# Patient Record
Sex: MALE | Race: WHITE | Employment: OTHER | URBAN - METROPOLITAN AREA
[De-identification: names, ages, dates, MRNs, and addresses within clinical notes are randomized per-mention and may not be internally consistent; named-entity substitution may affect disease eponyms.]

---

## 2018-05-15 LAB
ALBUMIN SERPL-MCNC: 3.3 G/DL
ALP BLD-CCNC: 64 U/L
ALT SERPL-CCNC: 24 U/L
ANION GAP SERPL CALCULATED.3IONS-SCNC: 0.8 MMOL/L
AST SERPL-CCNC: 21 U/L
BASOPHILS ABSOLUTE: 0 /ΜL
BASOPHILS RELATIVE PERCENT: 0.5 %
BILIRUB SERPL-MCNC: 0.5 MG/DL (ref 0.1–1.4)
BUN BLDV-MCNC: NORMAL MG/DL
CALCIUM SERPL-MCNC: 9.1 MG/DL
CHLORIDE BLD-SCNC: 110 MMOL/L
CO2: 23 MMOL/L
CREAT SERPL-MCNC: 2 MG/DL
EOSINOPHILS ABSOLUTE: 0.4 /ΜL
EOSINOPHILS RELATIVE PERCENT: 5.4 %
GFR CALCULATED: NORMAL
GLUCOSE BLD-MCNC: 130 MG/DL
HCT VFR BLD CALC: 40.4 % (ref 41–53)
HEMOGLOBIN: 13.2 G/DL (ref 13.5–17.5)
LYMPHOCYTES ABSOLUTE: 2.4 /ΜL
LYMPHOCYTES RELATIVE PERCENT: 30.8 %
MCH RBC QN AUTO: 29.8 PG
MCHC RBC AUTO-ENTMCNC: 32.7 G/DL
MCV RBC AUTO: 91.2 FL
MONOCYTES ABSOLUTE: 0.8 /ΜL
MONOCYTES RELATIVE PERCENT: 10.2 %
NEUTROPHILS ABSOLUTE: ABNORMAL /ΜL
NEUTROPHILS RELATIVE PERCENT: ABNORMAL %
PLATELET # BLD: 170 K/ΜL
PMV BLD AUTO: ABNORMAL FL
POTASSIUM SERPL-SCNC: 4.3 MMOL/L
RBC # BLD: 4.43 10^6/ΜL
SODIUM BLD-SCNC: 142 MMOL/L
TOTAL PROTEIN: 7.5
WBC # BLD: 7.7 10^3/ML

## 2018-05-16 LAB
CREATININE, URINE: 165
MICROALBUMIN/CREAT 24H UR: 195 MG/G{CREAT}
MICROALBUMIN/CREAT UR-RTO: 118.2

## 2018-09-13 LAB
ALBUMIN SERPL-MCNC: 4.4 G/DL
ALP BLD-CCNC: 66 U/L
ALT SERPL-CCNC: 13 U/L
ANION GAP SERPL CALCULATED.3IONS-SCNC: 13 MMOL/L
AST SERPL-CCNC: 20 U/L
AVERAGE GLUCOSE: 166
BILIRUB SERPL-MCNC: 0.6 MG/DL (ref 0.1–1.4)
BUN BLDV-MCNC: 28 MG/DL
CALCIUM SERPL-MCNC: 9.4 MG/DL
CHLORIDE BLD-SCNC: 103 MMOL/L
CHOLESTEROL, TOTAL: 125 MG/DL
CHOLESTEROL/HDL RATIO: 3.1
CO2: 26 MMOL/L
CREAT SERPL-MCNC: 1.6 MG/DL
GFR CALCULATED: ABNORMAL
GLUCOSE BLD-MCNC: 161 MG/DL
HBA1C MFR BLD: 7.4 %
HDLC SERPL-MCNC: 40 MG/DL (ref 35–70)
LDL CHOLESTEROL CALCULATED: 64 MG/DL (ref 0–160)
POTASSIUM SERPL-SCNC: 4.3 MMOL/L
SODIUM BLD-SCNC: 142 MMOL/L
TOTAL PROTEIN: 7.4
TRIGL SERPL-MCNC: 106 MG/DL
VLDLC SERPL CALC-MCNC: 21 MG/DL

## 2018-09-19 LAB — DIABETIC RETINOPATHY: NEGATIVE

## 2018-10-22 ENCOUNTER — INITIAL CONSULT (OUTPATIENT)
Dept: CARDIOLOGY CLINIC | Age: 78
End: 2018-10-22
Payer: MEDICARE

## 2018-10-22 VITALS
BODY MASS INDEX: 33.19 KG/M2 | HEART RATE: 69 BPM | WEIGHT: 219 LBS | DIASTOLIC BLOOD PRESSURE: 82 MMHG | HEIGHT: 68 IN | SYSTOLIC BLOOD PRESSURE: 130 MMHG

## 2018-10-22 DIAGNOSIS — R06.02 SOB (SHORTNESS OF BREATH): Primary | ICD-10-CM

## 2018-10-22 PROCEDURE — 93000 ELECTROCARDIOGRAM COMPLETE: CPT | Performed by: INTERNAL MEDICINE

## 2018-10-22 PROCEDURE — 99204 OFFICE O/P NEW MOD 45 MIN: CPT | Performed by: INTERNAL MEDICINE

## 2018-10-22 PROCEDURE — 1101F PT FALLS ASSESS-DOCD LE1/YR: CPT | Performed by: INTERNAL MEDICINE

## 2018-10-22 PROCEDURE — G8417 CALC BMI ABV UP PARAM F/U: HCPCS | Performed by: INTERNAL MEDICINE

## 2018-10-22 PROCEDURE — G8427 DOCREV CUR MEDS BY ELIG CLIN: HCPCS | Performed by: INTERNAL MEDICINE

## 2018-10-22 PROCEDURE — G8484 FLU IMMUNIZE NO ADMIN: HCPCS | Performed by: INTERNAL MEDICINE

## 2018-10-22 RX ORDER — PRAVASTATIN SODIUM 40 MG
40 TABLET ORAL DAILY
COMMUNITY
End: 2019-05-06 | Stop reason: SDUPTHER

## 2018-10-22 RX ORDER — HYDROCHLOROTHIAZIDE 12.5 MG/1
12.5 CAPSULE, GELATIN COATED ORAL DAILY
COMMUNITY
End: 2019-06-04 | Stop reason: SDUPTHER

## 2018-10-22 RX ORDER — METOPROLOL SUCCINATE 50 MG/1
50 TABLET, EXTENDED RELEASE ORAL DAILY
COMMUNITY
End: 2019-05-06 | Stop reason: SDUPTHER

## 2018-10-22 RX ORDER — LOSARTAN POTASSIUM 100 MG/1
100 TABLET ORAL DAILY
COMMUNITY
End: 2019-05-06 | Stop reason: SDUPTHER

## 2018-10-22 RX ORDER — GABAPENTIN 300 MG/1
300 CAPSULE ORAL DAILY
COMMUNITY
End: 2019-06-17 | Stop reason: SDUPTHER

## 2018-10-22 RX ORDER — PRAMIPEXOLE DIHYDROCHLORIDE 0.5 MG/1
0.5 TABLET ORAL DAILY
COMMUNITY
End: 2019-03-14 | Stop reason: SDUPTHER

## 2018-10-22 RX ORDER — GLIPIZIDE 5 MG/1
10 TABLET ORAL DAILY
COMMUNITY
End: 2021-12-29

## 2018-10-22 RX ORDER — DONEPEZIL HYDROCHLORIDE 5 MG/1
5 TABLET, FILM COATED ORAL NIGHTLY
COMMUNITY
End: 2019-08-28 | Stop reason: SDUPTHER

## 2018-10-22 NOTE — PROGRESS NOTES
CARDIOLOGY CONSULT NOTE   Reason for consultation: dyspnea on exertion    Referring physician:  Dr. iHlda Rosa  Primary care physician: Manpreet Pandey      Dear Dr. Hilda Rosa  Thanks for the consult. History of present illness:Nayan is a 66 y. o.year old who  presents with for shortness of breath which is moderate, for many years, intermittent, self limiting, not associated with cough or fever, gets worse with activity and better with rest, he has HTN, DM and dyslipidemia, he has CXR and was though to have atherosclerosis, he has denied any chest pain, no dizziness or syncope, he has quit smoking 25 yrs ago. Chief Complaint   Patient presents with    Hypertension    Hyperlipidemia     Blood pressure, cholesterol, blood glucose and weight are well controlled. Past medical history:    has a past medical history of Cancer ([facility] Hyperlipidemia; and Hypertension. Past surgical history:   has no past surgical history on file. Social History:   reports that he has quit smoking. He has never used smokeless tobacco. He reports that he does not drink alcohol or use drugs. Family history:   no family history of CAD, STROKE of DM    No Known Allergies      No current facility-administered medications for this visit. Current Outpatient Prescriptions   Medication Sig Dispense Refill    metoprolol succinate (TOPROL XL) 50 MG extended release tablet Take 50 mg by mouth daily      aspirin 81 MG tablet Take 81 mg by mouth daily      donepezil (ARICEPT) 5 MG tablet Take 5 mg by mouth nightly      gabapentin (NEURONTIN) 300 MG capsule Take 300 mg by mouth 3 times daily. .      glipiZIDE (GLUCOTROL) 5 MG tablet Take 5 mg by mouth 2 times daily (before meals)      hydrochlorothiazide (MICROZIDE) 12.5 MG capsule Take 12.5 mg by mouth daily      losartan (COZAAR) 100 MG tablet Take 100 mg by mouth daily      metFORMIN (GLUCOPHAGE) 500 MG tablet Take 500 mg by mouth 2 times daily (with meals)

## 2018-10-25 LAB — GLUCOSE BLD-MCNC: 159 MG/DL

## 2018-10-31 ENCOUNTER — PROCEDURE VISIT (OUTPATIENT)
Dept: CARDIOLOGY CLINIC | Age: 78
End: 2018-10-31
Payer: MEDICARE

## 2018-10-31 DIAGNOSIS — R06.02 SOB (SHORTNESS OF BREATH): ICD-10-CM

## 2018-10-31 LAB
LV EF: 51 %
LVEF MODALITY: NORMAL

## 2018-10-31 PROCEDURE — 78452 HT MUSCLE IMAGE SPECT MULT: CPT | Performed by: INTERNAL MEDICINE

## 2018-10-31 PROCEDURE — 93016 CV STRESS TEST SUPVJ ONLY: CPT | Performed by: INTERNAL MEDICINE

## 2018-10-31 PROCEDURE — 93017 CV STRESS TEST TRACING ONLY: CPT | Performed by: INTERNAL MEDICINE

## 2018-10-31 PROCEDURE — 93018 CV STRESS TEST I&R ONLY: CPT | Performed by: INTERNAL MEDICINE

## 2018-10-31 PROCEDURE — A9500 TC99M SESTAMIBI: HCPCS | Performed by: INTERNAL MEDICINE

## 2018-11-01 ENCOUNTER — PROCEDURE VISIT (OUTPATIENT)
Dept: CARDIOLOGY CLINIC | Age: 78
End: 2018-11-01
Payer: MEDICARE

## 2018-11-01 ENCOUNTER — TELEPHONE (OUTPATIENT)
Dept: CARDIOLOGY CLINIC | Age: 78
End: 2018-11-01

## 2018-11-01 DIAGNOSIS — R06.02 SOB (SHORTNESS OF BREATH): ICD-10-CM

## 2018-11-01 DIAGNOSIS — Z13.6 SCREENING FOR AAA (ABDOMINAL AORTIC ANEURYSM): Primary | ICD-10-CM

## 2018-11-01 PROCEDURE — 76706 US ABDL AORTA SCREEN AAA: CPT | Performed by: INTERNAL MEDICINE

## 2018-11-01 NOTE — TELEPHONE ENCOUNTER
Spoke with pt and gave testing results      Summary    Supervising physician Dr. Michael Oliveira .   Wilian Lund LV function.   Naman Henao is normal isotope uptake following exercise and at rest. There is no    evidence of exercise induced ischemia.    This is a normal study.        Recommendation    Recommendation: Routine follow-up.      Summary        No evidence of AAA within the visualized portions of the abdominal aorta.        Recommendations        optimize medications

## 2018-11-02 LAB
ALBUMIN SERPL-MCNC: 4.5 G/DL
ALP BLD-CCNC: 65 U/L
ALT SERPL-CCNC: 16 U/L
ANION GAP SERPL CALCULATED.3IONS-SCNC: ABNORMAL MMOL/L
AST SERPL-CCNC: 18 U/L
AVERAGE GLUCOSE: ABNORMAL
BILIRUB SERPL-MCNC: 0.6 MG/DL (ref 0.1–1.4)
BUN BLDV-MCNC: 25 MG/DL
CALCIUM SERPL-MCNC: 9.4 MG/DL
CHLORIDE BLD-SCNC: 103 MMOL/L
CHOLESTEROL, TOTAL: 132 MG/DL
CHOLESTEROL/HDL RATIO: NORMAL
CO2: 26 MMOL/L
CREAT SERPL-MCNC: 1.8 MG/DL
CREATININE, URINE: 151.9
GFR CALCULATED: 35
GLUCOSE BLD-MCNC: 126 MG/DL
HBA1C MFR BLD: 7.3 %
HDLC SERPL-MCNC: 37 MG/DL (ref 35–70)
LDL CHOLESTEROL CALCULATED: 74 MG/DL (ref 0–160)
MICROALBUMIN/CREAT 24H UR: ABNORMAL MG/G{CREAT}
MICROALBUMIN/CREAT UR-RTO: 160
POTASSIUM SERPL-SCNC: 4.4 MMOL/L
SODIUM BLD-SCNC: 143 MMOL/L
TOTAL PROTEIN: 7.3
TRIGL SERPL-MCNC: 107 MG/DL
VLDLC SERPL CALC-MCNC: 21 MG/DL

## 2018-11-14 ENCOUNTER — PROCEDURE VISIT (OUTPATIENT)
Dept: CARDIOLOGY CLINIC | Age: 78
End: 2018-11-14
Payer: MEDICARE

## 2018-11-14 DIAGNOSIS — R06.02 SOB (SHORTNESS OF BREATH): Primary | ICD-10-CM

## 2018-11-14 LAB
LV EF: 58 %
LVEF MODALITY: NORMAL

## 2018-11-14 PROCEDURE — 93306 TTE W/DOPPLER COMPLETE: CPT | Performed by: INTERNAL MEDICINE

## 2018-11-19 ENCOUNTER — OFFICE VISIT (OUTPATIENT)
Dept: CARDIOLOGY CLINIC | Age: 78
End: 2018-11-19
Payer: MEDICARE

## 2018-11-19 ENCOUNTER — TELEPHONE (OUTPATIENT)
Dept: CARDIOLOGY CLINIC | Age: 78
End: 2018-11-19

## 2018-11-19 VITALS
SYSTOLIC BLOOD PRESSURE: 118 MMHG | HEIGHT: 68 IN | HEART RATE: 88 BPM | DIASTOLIC BLOOD PRESSURE: 64 MMHG | WEIGHT: 221.6 LBS | BODY MASS INDEX: 33.59 KG/M2

## 2018-11-19 DIAGNOSIS — R06.02 SHORTNESS OF BREATH: Primary | ICD-10-CM

## 2018-11-19 PROCEDURE — 99214 OFFICE O/P EST MOD 30 MIN: CPT | Performed by: INTERNAL MEDICINE

## 2018-11-19 PROCEDURE — G8484 FLU IMMUNIZE NO ADMIN: HCPCS | Performed by: INTERNAL MEDICINE

## 2018-11-19 PROCEDURE — G8427 DOCREV CUR MEDS BY ELIG CLIN: HCPCS | Performed by: INTERNAL MEDICINE

## 2018-11-19 PROCEDURE — 1123F ACP DISCUSS/DSCN MKR DOCD: CPT | Performed by: INTERNAL MEDICINE

## 2018-11-19 PROCEDURE — G8417 CALC BMI ABV UP PARAM F/U: HCPCS | Performed by: INTERNAL MEDICINE

## 2018-11-19 PROCEDURE — 1036F TOBACCO NON-USER: CPT | Performed by: INTERNAL MEDICINE

## 2018-11-19 PROCEDURE — 1101F PT FALLS ASSESS-DOCD LE1/YR: CPT | Performed by: INTERNAL MEDICINE

## 2018-11-19 PROCEDURE — 4040F PNEUMOC VAC/ADMIN/RCVD: CPT | Performed by: INTERNAL MEDICINE

## 2018-11-19 NOTE — PROGRESS NOTES
Ashli Jenkins MD        OFFICE  FOLLOWUP NOTE    Chief complaints: patient is here for management of shortness of breath, HTN, DM, DYSLIPIDEMIA    Subjective: patient feels better, no chest pain, no shortness of breath, no dizziness, no palpitations    HPI Michael Campos is a 66 y. o.year old who  has a past medical [facility] of [facility] ([facility] H/O Doppler ultrasound; H/O echocardiogram; History of nuclear stress test; Hyperlipidemia; and Hypertension. and presents for management of  shortness of breath, HTN, DM, DYSLIPIDEMIA which are well controlled, NORMAL STRES TEST, ABDOMINAL ULTRASOUND      Current Outpatient Prescriptions   Medication Sig Dispense Refill    metoprolol succinate (TOPROL XL) 50 MG extended release tablet Take 50 mg by mouth daily      aspirin 81 MG tablet Take 81 mg by mouth daily      donepezil (ARICEPT) 5 MG tablet Take 5 mg by mouth nightly      gabapentin (NEURONTIN) 300 MG capsule Take 300 mg by mouth 3 times daily. .      glipiZIDE (GLUCOTROL) 5 MG tablet Take 5 mg by mouth 2 times daily (before meals)      hydrochlorothiazide (MICROZIDE) 12.5 MG capsule Take 12.5 mg by mouth daily      losartan (COZAAR) 100 MG tablet Take 100 mg by mouth daily      metFORMIN (GLUCOPHAGE) 500 MG tablet Take 500 mg by mouth 2 times daily (with meals)      pramipexole (MIRAPEX) 0.5 MG tablet Take 0.5 mg by mouth 3 times daily      pravastatin (PRAVACHOL) 40 MG tablet Take 40 mg by mouth daily       No current facility-administered medications for this visit. Allergies: Patient has no known allergies.   Past Medical History:   Diagnosis Date    Cancer ([facility]     Diabetes mellitus ([facility]     H/O Doppler ultrasound 10/31/2018    No evidence of AAA within the visualized portions of the abdominal aorta    H/O echocardiogram 11/14/2018    EF 55-60%    History of nuclear stress test 10/31/2018    Normal LV function    Hyperlipidemia     Hypertension      History reviewed. No pertinent surgical history. Family History   Problem Relation Age of Onset    Heart Attack Mother     Heart Attack Father      Social History   Substance Use Topics    Smoking status: Former Smoker    Smokeless tobacco: Never Used    Alcohol use No      [unfilled]  Review of Systems:   · Constitutional: No Fever or Weight Loss   · Eyes: No Decreased Vision  · ENT: No Headaches, Hearing Loss or Vertigo  · Cardiovascular: No chest pain,+ dyspnea on exertion, palpitations or loss of consciousness  · Respiratory: No cough or wheezing    · Gastrointestinal: No abdominal pain, appetite loss, blood in stools, constipation, diarrhea or heartburn  · Genitourinary: No dysuria, trouble voiding, or hematuria  · Musculoskeletal:  No gait disturbance, weakness or joint complaints  · Integumentary: No rash or pruritis  · Neurological: No TIA or stroke symptoms  · Psychiatric: No anxiety or depression  · Endocrine: No malaise, fatigue or temperature intolerance  · Hematologic/Lymphatic: No bleeding problems, blood clots or swollen lymph nodes  · Allergic/Immunologic: No nasal congestion or hives  All systems negative except as marked. Objective:  /64 (Site: Right Upper Arm, Position: Sitting, Cuff Size: Medium Adult)   Pulse 88   Ht 5' 8\" (1.727 m)   Wt 221 lb 9.6 oz (100.5 kg)   BMI 33.69 kg/m²   Wt Readings from Last 3 Encounters:   11/19/18 221 lb 9.6 oz (100.5 kg)   10/22/18 219 lb (99.3 kg)     Body mass index is 33.69 kg/m². GENERAL - Alert, oriented, pleasant, in no apparent distress,normal grooming  HEENT - pupils are reactive to light and accomodation, cornea intact, conjunctive normal color, ears are normal in exam,throat exam in normal, teeth, gum and palate are normal, oral mucosa is normal without any notation of pallor or cyanosis  Neck - Supple. No jugular venous distention noted.  No carotid bruits, no apical -carotid delay  Respiratory:  Normal breath sounds, No respiratory distress,

## 2019-01-14 ENCOUNTER — TELEPHONE (OUTPATIENT)
Dept: CARDIOLOGY CLINIC | Age: 79
End: 2019-01-14

## 2019-02-27 ENCOUNTER — TELEPHONE (OUTPATIENT)
Dept: INTERNAL MEDICINE CLINIC | Age: 79
End: 2019-02-27

## 2019-02-27 DIAGNOSIS — I10 ESSENTIAL HYPERTENSION: ICD-10-CM

## 2019-02-27 DIAGNOSIS — Z79.899 LONG TERM USE OF DRUG: Primary | ICD-10-CM

## 2019-03-05 ENCOUNTER — OFFICE VISIT (OUTPATIENT)
Dept: INTERNAL MEDICINE CLINIC | Age: 79
End: 2019-03-05
Payer: MEDICARE

## 2019-03-05 VITALS
HEART RATE: 72 BPM | SYSTOLIC BLOOD PRESSURE: 130 MMHG | OXYGEN SATURATION: 98 % | BODY MASS INDEX: 33.4 KG/M2 | DIASTOLIC BLOOD PRESSURE: 68 MMHG | WEIGHT: 220.4 LBS | HEIGHT: 68 IN

## 2019-03-05 DIAGNOSIS — N18.9 CHRONIC KIDNEY DISEASE, UNSPECIFIED CKD STAGE: ICD-10-CM

## 2019-03-05 DIAGNOSIS — F03.90 DEMENTIA WITHOUT BEHAVIORAL DISTURBANCE, UNSPECIFIED DEMENTIA TYPE: ICD-10-CM

## 2019-03-05 DIAGNOSIS — G62.9 NEUROPATHY: ICD-10-CM

## 2019-03-05 DIAGNOSIS — E78.5 HYPERLIPIDEMIA, UNSPECIFIED HYPERLIPIDEMIA TYPE: ICD-10-CM

## 2019-03-05 DIAGNOSIS — G25.81 RLS (RESTLESS LEGS SYNDROME): ICD-10-CM

## 2019-03-05 DIAGNOSIS — I10 ESSENTIAL HYPERTENSION: Primary | ICD-10-CM

## 2019-03-05 PROCEDURE — 99214 OFFICE O/P EST MOD 30 MIN: CPT | Performed by: FAMILY MEDICINE

## 2019-03-05 RX ORDER — CHOLECALCIFEROL (VITAMIN D3) 125 MCG
CAPSULE ORAL
COMMUNITY
End: 2021-03-31 | Stop reason: SDUPTHER

## 2019-03-05 ASSESSMENT — PATIENT HEALTH QUESTIONNAIRE - PHQ9
2. FEELING DOWN, DEPRESSED OR HOPELESS: 0
SUM OF ALL RESPONSES TO PHQ9 QUESTIONS 1 & 2: 0
SUM OF ALL RESPONSES TO PHQ QUESTIONS 1-9: 0
1. LITTLE INTEREST OR PLEASURE IN DOING THINGS: 0
SUM OF ALL RESPONSES TO PHQ QUESTIONS 1-9: 0

## 2019-03-09 ASSESSMENT — ENCOUNTER SYMPTOMS
CHEST TIGHTNESS: 0
SHORTNESS OF BREATH: 0
BLOOD IN STOOL: 0
ABDOMINAL PAIN: 0
WHEEZING: 0
BACK PAIN: 0
EYES NEGATIVE: 1
NAUSEA: 0

## 2019-03-11 ENCOUNTER — TELEPHONE (OUTPATIENT)
Dept: INTERNAL MEDICINE CLINIC | Age: 79
End: 2019-03-11

## 2019-03-14 RX ORDER — PRAMIPEXOLE DIHYDROCHLORIDE 0.5 MG/1
0.5 TABLET ORAL NIGHTLY
Qty: 90 TABLET | Refills: 1 | Status: SHIPPED | OUTPATIENT
Start: 2019-03-14 | End: 2019-09-23 | Stop reason: SDUPTHER

## 2019-03-28 ENCOUNTER — TELEPHONE (OUTPATIENT)
Dept: INTERNAL MEDICINE CLINIC | Age: 79
End: 2019-03-28
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
## 2019-04-03 ENCOUNTER — TELEPHONE (OUTPATIENT)
Dept: INTERNAL MEDICINE CLINIC | Age: 79
End: 2019-04-03

## 2019-04-03 NOTE — TELEPHONE ENCOUNTER
I sent patient billing for appointment here at [facility] any other billing he will need to contact [employer]  @ [phone]

## 2019-05-06 RX ORDER — PRAVASTATIN SODIUM 40 MG
40 TABLET ORAL DAILY
Qty: 90 TABLET | Refills: 1 | Status: SHIPPED | OUTPATIENT
Start: 2019-05-06 | End: 2019-10-28 | Stop reason: SDUPTHER

## 2019-05-06 RX ORDER — LOSARTAN POTASSIUM 100 MG/1
100 TABLET ORAL DAILY
Qty: 90 TABLET | Refills: 1 | Status: SHIPPED | OUTPATIENT
Start: 2019-05-06 | End: 2019-09-23 | Stop reason: SDUPTHER

## 2019-05-06 RX ORDER — METOPROLOL SUCCINATE 50 MG/1
50 TABLET, EXTENDED RELEASE ORAL DAILY
Qty: 90 TABLET | Refills: 1 | Status: SHIPPED | OUTPATIENT
Start: 2019-05-06 | End: 2019-11-19 | Stop reason: SDUPTHER

## 2019-05-21 ENCOUNTER — TELEPHONE (OUTPATIENT)
Dept: INTERNAL MEDICINE CLINIC | Age: 79
End: 2019-05-21

## 2019-05-22 ENCOUNTER — TELEPHONE (OUTPATIENT)
Dept: INTERNAL MEDICINE CLINIC | Age: 79
End: 2019-05-22

## 2019-05-22 ENCOUNTER — OFFICE VISIT (OUTPATIENT)
Dept: INTERNAL MEDICINE CLINIC | Age: 79
End: 2019-05-22
Payer: MEDICARE

## 2019-05-22 VITALS
OXYGEN SATURATION: 98 % | SYSTOLIC BLOOD PRESSURE: 112 MMHG | HEART RATE: 84 BPM | BODY MASS INDEX: 34.44 KG/M2 | RESPIRATION RATE: 12 BRPM | HEIGHT: 67 IN | DIASTOLIC BLOOD PRESSURE: 78 MMHG | WEIGHT: 219.4 LBS

## 2019-05-22 DIAGNOSIS — H25.9 SENILE CATARACT OF LEFT EYE, UNSPECIFIED AGE-RELATED CATARACT TYPE: ICD-10-CM

## 2019-05-22 DIAGNOSIS — Z01.818 PREOPERATIVE EXAMINATION: Primary | ICD-10-CM

## 2019-05-22 PROCEDURE — 99213 OFFICE O/P EST LOW 20 MIN: CPT | Performed by: FAMILY MEDICINE

## 2019-05-22 NOTE — TELEPHONE ENCOUNTER
Eileen Chirinos from [facility] called asking about pt's H &P paperwork. I called back and advised he is coming in today to get the physical and paperwork filled out and to call back with any more questions.

## 2019-05-27 NOTE — PROGRESS NOTES
Subjective:    Chief Complaint:     Zafar rAceo is a 78 y.o. male who presents for a preoperative physical examination. He is scheduled to have cataract surgery done by Dr. Jennifer Rockwell at the [facility] on 5/28/19. History of Present Illness: This  79 yo male here for preoperative evaluation for L cataract surgery. He has blurry vision and a L cataract. He had his R cataract surgery previously w/o complication. He is stable and doing well.     Past Medical History:   Diagnosis Date    Basal cell carcinoma of skin     Right Lower Eyelid    Cancer (HCC)     Cataract, bilateral     CKD (chronic kidney disease)     Family history of colon cancer     H/O Doppler ultrasound 10/31/2018    No evidence of AAA within the visualized portions of the abdominal aorta    H/O echocardiogram 11/14/2018    EF 55-60%    History of nuclear stress test 10/31/2018    Normal LV function    Hyperlipidemia     Hypertension     Macular hole, right eye     Obstructive sleep apnea (adult) (pediatric)     RLS (restless legs syndrome)     Type 2 diabetes mellitus with diabetic neuropathy (HCC)     Type 2 diabetes mellitus with hyperglycemia (HCC)     Unspecified dementia without behavioral disturbance         Review of patient's past surgical history indicates:     Past Surgical History:   Procedure Laterality Date    COLONOSCOPY  11/26/2012    sig diverticula-Recomm repeat in 5 yrs    MALIGNANT SKIN LESION EXCISION  10/26/2017    BCC excision with direct reconstruction right lower eyelid                                                   Current Outpatient Medications   Medication Sig Dispense Refill    metoprolol succinate (TOPROL XL) 50 MG extended release tablet Take 1 tablet by mouth daily 90 tablet 1    losartan (COZAAR) 100 MG tablet Take 1 tablet by mouth daily 90 tablet 1    pravastatin (PRAVACHOL) 40 MG tablet Take 1 tablet by mouth daily 90 tablet 1    pramipexole (MIRAPEX) 0.5 MG tablet Take

## 2019-06-04 RX ORDER — HYDROCHLOROTHIAZIDE 12.5 MG/1
12.5 CAPSULE, GELATIN COATED ORAL DAILY
Qty: 90 CAPSULE | Refills: 1 | Status: SHIPPED | OUTPATIENT
Start: 2019-06-04 | End: 2019-11-19 | Stop reason: SDUPTHER

## 2019-06-17 DIAGNOSIS — E11.42 DIABETIC POLYNEUROPATHY ASSOCIATED WITH TYPE 2 DIABETES MELLITUS (HCC): Primary | ICD-10-CM

## 2019-06-17 RX ORDER — GABAPENTIN 300 MG/1
300 CAPSULE ORAL DAILY
Qty: 90 CAPSULE | Refills: 1 | Status: SHIPPED | OUTPATIENT
Start: 2019-06-17 | End: 2019-11-19 | Stop reason: SDUPTHER

## 2019-06-17 NOTE — TELEPHONE ENCOUNTER
Stanford Pop from [address] [place] [address] called stating that pt no longer sees Dr. Shabana King and needs refill on Gabapentin- 90 day supply.

## 2019-08-28 RX ORDER — DONEPEZIL HYDROCHLORIDE 5 MG/1
5 TABLET, FILM COATED ORAL NIGHTLY
Qty: 90 TABLET | Refills: 0 | Status: SHIPPED | OUTPATIENT
Start: 2019-08-28 | End: 2019-11-19 | Stop reason: SDUPTHER

## 2019-09-03 ENCOUNTER — TELEPHONE (OUTPATIENT)
Dept: INTERNAL MEDICINE CLINIC | Age: 79
End: 2019-09-03

## 2019-09-03 DIAGNOSIS — E11.42 DIABETIC POLYNEUROPATHY ASSOCIATED WITH TYPE 2 DIABETES MELLITUS (HCC): ICD-10-CM

## 2019-09-03 DIAGNOSIS — I10 ESSENTIAL HYPERTENSION: Primary | ICD-10-CM

## 2019-09-05 ENCOUNTER — OFFICE VISIT (OUTPATIENT)
Dept: INTERNAL MEDICINE CLINIC | Age: 79
End: 2019-09-05
Payer: MEDICARE

## 2019-09-05 VITALS
HEIGHT: 67 IN | HEART RATE: 85 BPM | DIASTOLIC BLOOD PRESSURE: 78 MMHG | WEIGHT: 218 LBS | SYSTOLIC BLOOD PRESSURE: 130 MMHG | BODY MASS INDEX: 34.21 KG/M2 | OXYGEN SATURATION: 98 %

## 2019-09-05 DIAGNOSIS — E11.42 DIABETIC POLYNEUROPATHY ASSOCIATED WITH TYPE 2 DIABETES MELLITUS (HCC): ICD-10-CM

## 2019-09-05 DIAGNOSIS — G25.81 RLS (RESTLESS LEGS SYNDROME): ICD-10-CM

## 2019-09-05 DIAGNOSIS — E78.5 HYPERLIPIDEMIA, UNSPECIFIED HYPERLIPIDEMIA TYPE: ICD-10-CM

## 2019-09-05 DIAGNOSIS — I10 ESSENTIAL HYPERTENSION: Primary | ICD-10-CM

## 2019-09-05 PROCEDURE — 99214 OFFICE O/P EST MOD 30 MIN: CPT | Performed by: FAMILY MEDICINE

## 2019-09-06 LAB
ALBUMIN SERPL-MCNC: 4.3 G/DL
ALP BLD-CCNC: 76 U/L
ALT SERPL-CCNC: 13 U/L
ANION GAP SERPL CALCULATED.3IONS-SCNC: 1.6 MMOL/L
AST SERPL-CCNC: 17 U/L
BILIRUB SERPL-MCNC: 0.4 MG/DL (ref 0.1–1.4)
BUN BLDV-MCNC: 37 MG/DL
CALCIUM SERPL-MCNC: 9.4 MG/DL
CHLORIDE BLD-SCNC: 107 MMOL/L
CO2: 21 MMOL/L
CREAT SERPL-MCNC: 2 MG/DL
GFR CALCULATED: NORMAL
GLUCOSE BLD-MCNC: 119 MG/DL
POTASSIUM SERPL-SCNC: 4.7 MMOL/L
SODIUM BLD-SCNC: 143 MMOL/L
TOTAL PROTEIN: 7

## 2019-09-08 PROBLEM — E11.42 DIABETIC POLYNEUROPATHY ASSOCIATED WITH TYPE 2 DIABETES MELLITUS (HCC): Status: ACTIVE | Noted: 2019-09-08

## 2019-09-08 ASSESSMENT — ENCOUNTER SYMPTOMS
BLOOD IN STOOL: 0
SHORTNESS OF BREATH: 0
CONSTIPATION: 0
NAUSEA: 0
CHEST TIGHTNESS: 0
BACK PAIN: 0
ABDOMINAL PAIN: 0
DIARRHEA: 0
EYES NEGATIVE: 1
WHEEZING: 0

## 2019-09-08 NOTE — PROGRESS NOTES
Marcus Garcia  1940  78 y.o.  male    Chief Complaint   Patient presents with    6 Month Follow-Up     Pt here for 6 month f/u HTN         History of Present Illness  Marcus Garcia is a 78 y.o. male who presents today for a check up with his wife. Last labs reviewed and he has labs pending. No Cp or Sob. He would like to change his contact information as he is now estranged from his daughter.  -He has DM II managed by Dr. Landen Villatoro, and he will have a f/u appt soon  -Diabetic neuropathy- Stable on Gabapentin w/o SE  -HTN-Stable on HCTZ, Losartan and Metoprolol  -HLD-Stable on Pravachol 40. LDL 74  -RLS-Stable on Mirapex  -Dementia- Very mild. He is on Aricept. No confusion or problems on medication  -He has been stable on his medications. He denies problems and there are no concerns about his health today    Review of Systems   Constitutional: Negative for chills, diaphoresis and fever. HENT: Negative. Eyes: Negative. Respiratory: Negative for chest tightness, shortness of breath and wheezing. Cardiovascular: Negative for chest pain and palpitations. Gastrointestinal: Negative for abdominal pain, blood in stool, constipation, diarrhea and nausea. Genitourinary: Negative for difficulty urinating and dysuria. Musculoskeletal: Negative for back pain and neck pain. Skin: Negative. Neurological: Negative for dizziness, light-headedness and headaches. Psychiatric/Behavioral: Negative for sleep disturbance.         No changes in mood       Allergies   Allergen Reactions    Lyrica [Pregabalin]      Confusion, sleepiness       Past Medical History:   Diagnosis Date    Basal cell carcinoma of skin     Right Lower Eyelid    Cancer (HCC)     Cataract, bilateral     CKD (chronic kidney disease)     Family history of colon cancer     H/O Doppler ultrasound 10/31/2018    No evidence of AAA within the visualized portions of the abdominal aorta    H/O echocardiogram 11/14/2018    EF 55-60%    History of nuclear stress test 10/31/2018    Normal LV function    Hyperlipidemia     Hypertension     Macular hole, right eye     Obstructive sleep apnea (adult) (pediatric)     RLS (restless legs syndrome)     Type 2 diabetes mellitus with diabetic neuropathy (HCC)     Type 2 diabetes mellitus with hyperglycemia (HCC)     Unspecified dementia without behavioral disturbance        Past Surgical History:   Procedure Laterality Date    COLONOSCOPY  2012    sig diverticula-Recomm repeat in 5 yrs    MALIGNANT SKIN LESION EXCISION  10/26/2017    BCC excision with direct reconstruction right lower eyelid       Family History   Problem Relation Age of Onset    Heart Attack Mother     Heart Attack Father        Social History     Tobacco Use    Smoking status: Former Smoker     Packs/day: 1.00     Years: 35.00     Pack years: 35.00     Last attempt to quit:      Years since quittin.7    Smokeless tobacco: Never Used   Substance Use Topics    Alcohol use: No    Drug use: No       Current Outpatient Medications   Medication Sig Dispense Refill    donepezil (ARICEPT) 5 MG tablet Take 1 tablet by mouth nightly 90 tablet 0    gabapentin (NEURONTIN) 300 MG capsule Take 1 capsule by mouth daily for 180 days.  90 capsule 1    hydrochlorothiazide (MICROZIDE) 12.5 MG capsule Take 1 capsule by mouth daily 90 capsule 1    metoprolol succinate (TOPROL XL) 50 MG extended release tablet Take 1 tablet by mouth daily 90 tablet 1    losartan (COZAAR) 100 MG tablet Take 1 tablet by mouth daily 90 tablet 1    pravastatin (PRAVACHOL) 40 MG tablet Take 1 tablet by mouth daily 90 tablet 1    pramipexole (MIRAPEX) 0.5 MG tablet Take 1 tablet by mouth nightly 90 tablet 1    Cholecalciferol (VITAMIN D3) 2000 units TABS Take by mouth      aspirin 81 MG tablet Take 81 mg by mouth daily      glipiZIDE (GLUCOTROL) 5 MG tablet Take 10 mg by mouth 2 times daily (before meals)       metFORMIN (GLUCOPHAGE) 500 MG

## 2019-09-09 ENCOUNTER — TELEPHONE (OUTPATIENT)
Dept: INTERNAL MEDICINE CLINIC | Age: 79
End: 2019-09-09

## 2019-09-18 ENCOUNTER — TELEPHONE (OUTPATIENT)
Dept: INTERNAL MEDICINE CLINIC | Age: 79
End: 2019-09-18

## 2019-09-18 DIAGNOSIS — N18.30 STAGE 3 CHRONIC KIDNEY DISEASE (HCC): Primary | ICD-10-CM

## 2019-09-18 NOTE — TELEPHONE ENCOUNTER
Pt called stating he saw his endocrinologist last week, and he would like pt to see a kidney specialist. Pt states his wife sees one and he would like to see the same one.  Also pt states that we should be getting lab results from endocrinologist

## 2019-09-23 RX ORDER — LOSARTAN POTASSIUM 100 MG/1
100 TABLET ORAL DAILY
Qty: 90 TABLET | Refills: 2 | Status: SHIPPED | OUTPATIENT
Start: 2019-09-23 | End: 2020-06-25

## 2019-09-23 RX ORDER — PRAMIPEXOLE DIHYDROCHLORIDE 0.5 MG/1
0.5 TABLET ORAL NIGHTLY
Qty: 90 TABLET | Refills: 2 | Status: SHIPPED | OUTPATIENT
Start: 2019-09-23 | End: 2020-06-02

## 2019-10-28 RX ORDER — PRAVASTATIN SODIUM 40 MG
40 TABLET ORAL DAILY
Qty: 90 TABLET | Refills: 1 | Status: SHIPPED | OUTPATIENT
Start: 2019-10-28 | End: 2020-05-04

## 2019-11-11 DIAGNOSIS — E11.69 TYPE 2 DIABETES MELLITUS WITH OTHER SPECIFIED COMPLICATION, WITH LONG-TERM CURRENT USE OF INSULIN (HCC): ICD-10-CM

## 2019-11-11 DIAGNOSIS — N18.9 CHRONIC KIDNEY DISEASE, UNSPECIFIED CKD STAGE: Primary | ICD-10-CM

## 2019-11-11 DIAGNOSIS — Z79.4 TYPE 2 DIABETES MELLITUS WITH OTHER SPECIFIED COMPLICATION, WITH LONG-TERM CURRENT USE OF INSULIN (HCC): ICD-10-CM

## 2019-11-11 DIAGNOSIS — I10 ESSENTIAL HYPERTENSION: ICD-10-CM

## 2019-11-18 ENCOUNTER — TELEPHONE (OUTPATIENT)
Dept: INTERNAL MEDICINE CLINIC | Age: 79
End: 2019-11-18

## 2019-11-19 RX ORDER — GABAPENTIN 300 MG/1
300 CAPSULE ORAL DAILY
Qty: 90 CAPSULE | Refills: 1 | Status: SHIPPED | OUTPATIENT
Start: 2019-11-19 | End: 2020-05-04

## 2019-11-19 RX ORDER — METOPROLOL SUCCINATE 50 MG/1
50 TABLET, EXTENDED RELEASE ORAL DAILY
Qty: 90 TABLET | Refills: 1 | Status: SHIPPED | OUTPATIENT
Start: 2019-11-19 | End: 2020-05-04

## 2019-11-19 RX ORDER — HYDROCHLOROTHIAZIDE 12.5 MG/1
12.5 CAPSULE, GELATIN COATED ORAL DAILY
Qty: 90 CAPSULE | Refills: 1 | Status: SHIPPED | OUTPATIENT
Start: 2019-11-19 | End: 2020-05-04

## 2019-11-19 RX ORDER — DONEPEZIL HYDROCHLORIDE 5 MG/1
5 TABLET, FILM COATED ORAL NIGHTLY
Qty: 90 TABLET | Refills: 1 | Status: SHIPPED | OUTPATIENT
Start: 2019-11-19 | End: 2020-05-04

## 2020-01-22 DIAGNOSIS — I10 ESSENTIAL HYPERTENSION: ICD-10-CM

## 2020-01-22 DIAGNOSIS — E11.42 DIABETIC POLYNEUROPATHY ASSOCIATED WITH TYPE 2 DIABETES MELLITUS (HCC): ICD-10-CM

## 2020-01-22 LAB
A/G RATIO: 1.5 (ref 1.1–2.2)
ALBUMIN SERPL-MCNC: 4.3 G/DL (ref 3.4–5)
ALP BLD-CCNC: 71 U/L (ref 40–129)
ALT SERPL-CCNC: 11 U/L (ref 10–40)
ANION GAP SERPL CALCULATED.3IONS-SCNC: 17 MMOL/L (ref 3–16)
AST SERPL-CCNC: 17 U/L (ref 15–37)
BILIRUB SERPL-MCNC: 0.5 MG/DL (ref 0–1)
BUN BLDV-MCNC: 30 MG/DL (ref 7–20)
CALCIUM SERPL-MCNC: 9.5 MG/DL (ref 8.3–10.6)
CHLORIDE BLD-SCNC: 104 MMOL/L (ref 99–110)
CO2: 22 MMOL/L (ref 21–32)
CREAT SERPL-MCNC: 2 MG/DL (ref 0.8–1.3)
GFR AFRICAN AMERICAN: 39
GFR NON-AFRICAN AMERICAN: 32
GLOBULIN: 2.9 G/DL
GLUCOSE BLD-MCNC: 167 MG/DL (ref 70–99)
POTASSIUM SERPL-SCNC: 4.4 MMOL/L (ref 3.5–5.1)
SODIUM BLD-SCNC: 143 MMOL/L (ref 136–145)
TOTAL PROTEIN: 7.2 G/DL (ref 6.4–8.2)

## 2020-01-24 PROBLEM — I10 ESSENTIAL HYPERTENSION: Status: ACTIVE | Noted: 2020-01-24

## 2020-01-24 PROBLEM — I51.89 DIASTOLIC DYSFUNCTION: Status: ACTIVE | Noted: 2020-01-24

## 2020-01-24 PROBLEM — F01.50 VASCULAR DEMENTIA WITHOUT BEHAVIORAL DISTURBANCE (HCC): Status: ACTIVE | Noted: 2020-01-24

## 2020-01-24 PROBLEM — E66.3 OVERWEIGHT: Status: ACTIVE | Noted: 2020-01-24

## 2020-01-24 PROBLEM — R80.1 PERSISTENT PROTEINURIA: Status: ACTIVE | Noted: 2020-01-24

## 2020-01-24 PROBLEM — N18.30 CHRONIC KIDNEY DISEASE, STAGE III (MODERATE) (HCC): Status: ACTIVE | Noted: 2020-01-24

## 2020-01-24 PROBLEM — Z79.4 TYPE 2 DIABETES MELLITUS WITHOUT COMPLICATION, WITH LONG-TERM CURRENT USE OF INSULIN (HCC): Status: ACTIVE | Noted: 2020-01-24

## 2020-01-24 PROBLEM — E11.9 TYPE 2 DIABETES MELLITUS WITHOUT COMPLICATION, WITH LONG-TERM CURRENT USE OF INSULIN (HCC): Status: ACTIVE | Noted: 2020-01-24

## 2020-01-27 ENCOUNTER — HOSPITAL ENCOUNTER (OUTPATIENT)
Dept: ULTRASOUND IMAGING | Age: 80
Discharge: HOME | End: 2020-01-27
Payer: MEDICARE

## 2020-01-27 PROCEDURE — 76775 US EXAM ABDO BACK WALL LIM: CPT

## 2020-03-12 ENCOUNTER — OFFICE VISIT (OUTPATIENT)
Dept: INTERNAL MEDICINE CLINIC | Age: 80
End: 2020-03-12
Payer: MEDICARE

## 2020-03-12 VITALS
HEIGHT: 67 IN | DIASTOLIC BLOOD PRESSURE: 82 MMHG | BODY MASS INDEX: 33.65 KG/M2 | HEART RATE: 98 BPM | SYSTOLIC BLOOD PRESSURE: 126 MMHG | OXYGEN SATURATION: 99 % | WEIGHT: 214.4 LBS

## 2020-03-12 PROCEDURE — G8417 CALC BMI ABV UP PARAM F/U: HCPCS | Performed by: FAMILY MEDICINE

## 2020-03-12 PROCEDURE — 4040F PNEUMOC VAC/ADMIN/RCVD: CPT | Performed by: FAMILY MEDICINE

## 2020-03-12 PROCEDURE — G8427 DOCREV CUR MEDS BY ELIG CLIN: HCPCS | Performed by: FAMILY MEDICINE

## 2020-03-12 PROCEDURE — 99214 OFFICE O/P EST MOD 30 MIN: CPT | Performed by: FAMILY MEDICINE

## 2020-03-12 PROCEDURE — G8484 FLU IMMUNIZE NO ADMIN: HCPCS | Performed by: FAMILY MEDICINE

## 2020-03-12 PROCEDURE — 1036F TOBACCO NON-USER: CPT | Performed by: FAMILY MEDICINE

## 2020-03-12 PROCEDURE — 1123F ACP DISCUSS/DSCN MKR DOCD: CPT | Performed by: FAMILY MEDICINE

## 2020-03-12 ASSESSMENT — PATIENT HEALTH QUESTIONNAIRE - PHQ9
SUM OF ALL RESPONSES TO PHQ QUESTIONS 1-9: 0
SUM OF ALL RESPONSES TO PHQ QUESTIONS 1-9: 0
1. LITTLE INTEREST OR PLEASURE IN DOING THINGS: 0
SUM OF ALL RESPONSES TO PHQ9 QUESTIONS 1 & 2: 0
2. FEELING DOWN, DEPRESSED OR HOPELESS: 0

## 2020-03-13 ASSESSMENT — ENCOUNTER SYMPTOMS
DIARRHEA: 0
BLOOD IN STOOL: 0
NAUSEA: 0
SHORTNESS OF BREATH: 0
BACK PAIN: 0
COUGH: 0
ABDOMINAL PAIN: 0
CONSTIPATION: 0
CHEST TIGHTNESS: 0

## 2020-03-13 NOTE — PROGRESS NOTES
Fred Becerra  1940  [de-identified] y.o.  male    Chief Complaint   Patient presents with    6 Month Follow-Up         History of Present Illness  Fred Becerra is a [de-identified] y.o. male who presents today for a check up. Last labs reviewed. No Cp or Sob.  -Diabetic polyneuropathy- Pt on Gabapentin. He has DM II managed by Dr. Bartolo Morocho. Labs Care everywhere- Hba1c 7.3  -HTN- Stable on medications. -CKD III- managed by nephrology  -HLD- Stable on Pravachol 40  -RLS- On Pramipexole and stable  -Dementia- Stable. Mild on Aricept    Review of Systems   Constitutional: Negative for diaphoresis and fever. HENT: Negative. Respiratory: Negative for cough, chest tightness and shortness of breath. Cardiovascular: Negative for chest pain and palpitations. Gastrointestinal: Negative for abdominal pain, blood in stool, constipation, diarrhea and nausea. Genitourinary: Negative for difficulty urinating. Musculoskeletal: Negative for back pain. Neurological: Positive for numbness (feet). Negative for dizziness, light-headedness and headaches. Psychiatric/Behavioral: Negative for dysphoric mood and sleep disturbance.        Allergies   Allergen Reactions    Lyrica [Pregabalin]      Confusion, sleepiness       Past Medical History:   Diagnosis Date    Basal cell carcinoma of skin     Right Lower Eyelid    Cancer (HCC)     Cataract, bilateral     CKD (chronic kidney disease)     Family history of colon cancer     H/O Doppler ultrasound 10/31/2018    No evidence of AAA within the visualized portions of the abdominal aorta    H/O echocardiogram 11/14/2018    EF 55-60%    History of nuclear stress test 10/31/2018    Normal LV function    Hyperlipidemia     Hypertension     Macular hole, right eye     Obstructive sleep apnea (adult) (pediatric)     RLS (restless legs syndrome)     Type 2 diabetes mellitus with diabetic neuropathy (HCC)     Type 2 diabetes mellitus with hyperglycemia ([facility]     Unspecified dementia without behavioral disturbance [facility])        Past Surgical History:   Procedure Laterality Date    COLONOSCOPY  2012    sig diverticula-Recomm repeat in 5 yrs    MALIGNANT SKIN LESION EXCISION  10/26/2017    BCC excision with direct reconstruction right lower eyelid       Family History   Problem Relation Age of Onset    Heart Attack Mother     Heart Attack Father        Social History     Tobacco Use    Smoking status: Former Smoker     Packs/day: 1.00     Years: 35.00     Pack years: 35.00     Last attempt to quit:      Years since quittin.2    Smokeless tobacco: Never Used   Substance Use Topics    Alcohol use: No    Drug use: No       Current Outpatient Medications   Medication Sig Dispense Refill    insulin glargine (BASAGLAR KWIKPEN) 100 UNIT/ML injection pen Inject 55 Units into the skin nightly      Dulaglutide (TRULICITY) 0.56 VU/3.0IA SOPN Inject 0.75 mg into the skin once a week      citalopram (CELEXA) 10 MG tablet Take 1 tablet by mouth daily 90 tablet 3    gabapentin (NEURONTIN) 300 MG capsule Take 1 capsule by mouth daily for 180 days.  90 capsule 1    donepezil (ARICEPT) 5 MG tablet Take 1 tablet by mouth nightly 90 tablet 1    hydrochlorothiazide (MICROZIDE) 12.5 MG capsule Take 1 capsule by mouth daily 90 capsule 1    metoprolol succinate (TOPROL XL) 50 MG extended release tablet Take 1 tablet by mouth daily 90 tablet 1    pravastatin (PRAVACHOL) 40 MG tablet Take 1 tablet by mouth daily 90 tablet 1    pramipexole (MIRAPEX) 0.5 MG tablet Take 1 tablet by mouth nightly 90 tablet 2    losartan (COZAAR) 100 MG tablet Take 1 tablet by mouth daily 90 tablet 2    Cholecalciferol (VITAMIN D3) 2000 units TABS Take by mouth      aspirin 81 MG tablet Take 81 mg by mouth daily      glipiZIDE (GLUCOTROL) 5 MG tablet Take 10 mg by mouth 2 times daily (before meals)       metFORMIN (GLUCOPHAGE) 500 MG tablet Take 500 mg by mouth daily        No current

## 2020-03-18 ENCOUNTER — TELEPHONE (OUTPATIENT)
Dept: INTERNAL MEDICINE CLINIC | Age: 80
End: 2020-03-18

## 2020-03-18 NOTE — TELEPHONE ENCOUNTER
Dr. Georgette Kawasaki  Is requesting diabetic notes on this patient for diabetic foot care. The patient is managed by Dr. Patricia Rockwell at [facility] (Last note in Care everywhere). You will have to call Dr. Jaymie Mccray office to see if this is what they need.  I don't treat is diabetes # [phone]

## 2020-03-23 ENCOUNTER — INITIAL CONSULT (OUTPATIENT)
Dept: PULMONOLOGY | Age: 80
End: 2020-03-23
Payer: MEDICARE

## 2020-03-23 VITALS
BODY MASS INDEX: 34.06 KG/M2 | TEMPERATURE: 98.2 F | SYSTOLIC BLOOD PRESSURE: 130 MMHG | HEART RATE: 82 BPM | WEIGHT: 217 LBS | OXYGEN SATURATION: 97 % | DIASTOLIC BLOOD PRESSURE: 86 MMHG | HEIGHT: 67 IN

## 2020-03-23 PROBLEM — G47.10 HYPERSOMNIA: Status: ACTIVE | Noted: 2020-03-23

## 2020-03-23 PROBLEM — Z87.891 EX-SMOKER: Status: ACTIVE | Noted: 2020-03-23

## 2020-03-23 PROBLEM — E66.9 OBESITY (BMI 30-39.9): Status: ACTIVE | Noted: 2020-03-23

## 2020-03-23 PROBLEM — G47.33 OSA (OBSTRUCTIVE SLEEP APNEA): Status: ACTIVE | Noted: 2020-03-23

## 2020-03-23 PROCEDURE — G8417 CALC BMI ABV UP PARAM F/U: HCPCS | Performed by: INTERNAL MEDICINE

## 2020-03-23 PROCEDURE — 1123F ACP DISCUSS/DSCN MKR DOCD: CPT | Performed by: INTERNAL MEDICINE

## 2020-03-23 PROCEDURE — 1036F TOBACCO NON-USER: CPT | Performed by: INTERNAL MEDICINE

## 2020-03-23 PROCEDURE — G8427 DOCREV CUR MEDS BY ELIG CLIN: HCPCS | Performed by: INTERNAL MEDICINE

## 2020-03-23 PROCEDURE — 4040F PNEUMOC VAC/ADMIN/RCVD: CPT | Performed by: INTERNAL MEDICINE

## 2020-03-23 PROCEDURE — G8484 FLU IMMUNIZE NO ADMIN: HCPCS | Performed by: INTERNAL MEDICINE

## 2020-03-23 PROCEDURE — 99204 OFFICE O/P NEW MOD 45 MIN: CPT | Performed by: INTERNAL MEDICINE

## 2020-03-23 ASSESSMENT — SLEEP AND FATIGUE QUESTIONNAIRES
HOW LIKELY ARE YOU TO NOD OFF OR FALL ASLEEP WHILE WATCHING TV: 2
HOW LIKELY ARE YOU TO NOD OFF OR FALL ASLEEP WHILE SITTING QUIETLY AFTER LUNCH WITHOUT ALCOHOL: 2
NECK CIRCUMFERENCE (INCHES): 19
HOW LIKELY ARE YOU TO NOD OFF OR FALL ASLEEP WHILE LYING DOWN TO REST IN THE AFTERNOON WHEN CIRCUMSTANCES PERMIT: 2
HOW LIKELY ARE YOU TO NOD OFF OR FALL ASLEEP WHILE SITTING INACTIVE IN A PUBLIC PLACE: 2
HOW LIKELY ARE YOU TO NOD OFF OR FALL ASLEEP WHILE SITTING AND TALKING TO SOMEONE: 1
HOW LIKELY ARE YOU TO NOD OFF OR FALL ASLEEP IN A CAR, WHILE STOPPED FOR A FEW MINUTES IN TRAFFIC: 1
HOW LIKELY ARE YOU TO NOD OFF OR FALL ASLEEP WHILE SITTING AND READING: 1
HOW LIKELY ARE YOU TO NOD OFF OR FALL ASLEEP WHEN YOU ARE A PASSENGER IN A CAR FOR AN HOUR WITHOUT A BREAK: 2
ESS TOTAL SCORE: 13

## 2020-03-23 ASSESSMENT — ENCOUNTER SYMPTOMS
COUGH: 0
EYE DISCHARGE: 0
ABDOMINAL DISTENTION: 0
SHORTNESS OF BREATH: 0
EYE ITCHING: 0
BACK PAIN: 0
ABDOMINAL PAIN: 0

## 2020-03-23 NOTE — PROGRESS NOTES
Kahokakay Gonzalez  1940  Referring Provider: Dr. Cobb Daily:     Chief Complaint   Patient presents with    Sleep Apnea     patient was referred for sleep apnea has had two sleep studies in [address] is a [de-identified] y.o. male has been referred for the DENISE. He has been diagnosed with DENISE about 12 years ago in [place]. He has gained about 10 lbs in the last 12 years. He has not using the CPAP about 6 months. .    He goes to bed at midnight and it takes about 20 mins to fall asleep. He snores and stops breathing as per his wife. He wakes up about 1 time to go to the bathroom. It takes about few mins to fall back to sleep. He never woke up choking or gasping for air, woke up with dry mouth, no palpitations. He gets up at 9 am and he is not tired. He has no morning headaches. He is not sleepy during the day time. He has no RLS. He has no sleep paralysis, no cataplexy,,no hypnogogic or hypnopompic hallucinations. He has no depression or anxiety. He has h/o smoking 1 pk per day for 35 years which he quit about 25 years ago. He is not a shift worker. Current Outpatient Medications   Medication Sig Dispense Refill    insulin glargine (BASAGLAR KWIKPEN) 100 UNIT/ML injection pen Inject 55 Units into the skin nightly      Dulaglutide (TRULICITY) 7.57 ZS/4.9RZ SOPN Inject 0.75 mg into the skin once a week      citalopram (CELEXA) 10 MG tablet Take 1 tablet by mouth daily 90 tablet 3    gabapentin (NEURONTIN) 300 MG capsule Take 1 capsule by mouth daily for 180 days.  90 capsule 1    donepezil (ARICEPT) 5 MG tablet Take 1 tablet by mouth nightly 90 tablet 1    hydrochlorothiazide (MICROZIDE) 12.5 MG capsule Take 1 capsule by mouth daily 90 capsule 1    metoprolol succinate (TOPROL XL) 50 MG extended release tablet Take 1 tablet by mouth daily 90 tablet 1    pravastatin (PRAVACHOL) 40 MG tablet Take 1 tablet by mouth daily 90 tablet 1    pramipexole (MIRAPEX) 0.5 MG tablet Take 1 tablet by mouth Tobacco Use    Smoking status: Former Smoker     Packs/day: 1.00     Years: 35.00     Pack years: 35.00     Last attempt to quit:      Years since quittin.2    Smokeless tobacco: Never Used   Substance and Sexual Activity    Alcohol use: No    Drug use: No    Sexual activity: None   Lifestyle    Physical activity     Days per week: None     Minutes per session: None    Stress: None   Relationships    Social connections     Talks on phone: None     Gets together: None     Attends Mandaen service: None     Active member of club or organization: None     Attends meetings of clubs or organizations: None     Relationship status: None    Intimate partner violence     Fear of current or ex partner: None     Emotionally abused: None     Physically abused: None     Forced sexual activity: None   Other Topics Concern    None   Social History Narrative    None       Review of Systems   Constitutional: Positive for fatigue. HENT: Negative for congestion and postnasal drip. Eyes: Negative for discharge and itching. Respiratory: Negative for cough and shortness of breath. Cardiovascular: Negative for chest pain and leg swelling. Gastrointestinal: Negative for abdominal distention and abdominal pain. Endocrine: Negative for cold intolerance and heat intolerance. Genitourinary: Negative for enuresis and frequency. Musculoskeletal: Negative for arthralgias and back pain. Allergic/Immunologic: Negative for environmental allergies and food allergies. Neurological: Negative for light-headedness and headaches. Hematological: Negative for adenopathy. Psychiatric/Behavioral: Negative for agitation and behavioral problems. Objective:   /86 (Site: Left Upper Arm, Position: Sitting, Cuff Size: Large Adult)   Pulse 82   Temp 98.2 °F (36.8 °C) (Oral)   Ht 5' 7\" (1.702 m)   Wt 217 lb (98.4 kg)   SpO2 97%   BMI 33.99 kg/m²   Body mass index is 33.99 kg/m².   Sleep Medicine (GLUCOPHAGE) 500 MG tablet    insulin glargine (BASAGLAR KWIKPEN) 100 UNIT/ML injection pen    Dulaglutide (TRULICITY) 9.59 LI/2.1MP SOPN    Hypersomnia     Advised to go for the split night study  Loose weight         Ex-smoker     Advised to c/w quitting smoking                    Return in about 6 weeks (around 5/4/2020) for CPAP/BIPAP titration, Sleep Study.      Progress notes sent to the referring Provider    Rolanda De Anda MD  3/23/2020  2:06 PM

## 2020-05-04 RX ORDER — GABAPENTIN 300 MG/1
CAPSULE ORAL
Qty: 90 CAPSULE | Refills: 2 | Status: SHIPPED | OUTPATIENT
Start: 2020-05-04 | End: 2020-10-06

## 2020-05-04 RX ORDER — HYDROCHLOROTHIAZIDE 12.5 MG/1
CAPSULE, GELATIN COATED ORAL
Qty: 90 CAPSULE | Refills: 2 | Status: SHIPPED | OUTPATIENT
Start: 2020-05-04 | End: 2021-01-11

## 2020-05-04 RX ORDER — DONEPEZIL HYDROCHLORIDE 5 MG/1
TABLET, FILM COATED ORAL
Qty: 90 TABLET | Refills: 2 | Status: SHIPPED | OUTPATIENT
Start: 2020-05-04 | End: 2021-01-11

## 2020-05-04 RX ORDER — PRAVASTATIN SODIUM 40 MG
TABLET ORAL
Qty: 90 TABLET | Refills: 2 | Status: SHIPPED | OUTPATIENT
Start: 2020-05-04 | End: 2021-01-11

## 2020-05-04 RX ORDER — METOPROLOL SUCCINATE 50 MG/1
TABLET, EXTENDED RELEASE ORAL
Qty: 90 TABLET | Refills: 2 | Status: SHIPPED | OUTPATIENT
Start: 2020-05-04 | End: 2021-03-02

## 2020-06-02 RX ORDER — PRAMIPEXOLE DIHYDROCHLORIDE 0.5 MG/1
0.5 TABLET ORAL NIGHTLY
Qty: 90 TABLET | Refills: 1 | Status: SHIPPED | OUTPATIENT
Start: 2020-06-02 | End: 2020-11-11

## 2020-06-04 ENCOUNTER — HOSPITAL ENCOUNTER (OUTPATIENT)
Dept: SLEEP CENTER | Age: 80
Discharge: HOME | End: 2020-06-04
Payer: MEDICARE

## 2020-06-04 VITALS — BODY MASS INDEX: 34.06 KG/M2 | HEIGHT: 67 IN | WEIGHT: 217 LBS

## 2020-06-04 PROCEDURE — 95810 POLYSOM 6/> YRS 4/> PARAM: CPT

## 2020-06-04 ASSESSMENT — SLEEP AND FATIGUE QUESTIONNAIRES
HOW LIKELY ARE YOU TO NOD OFF OR FALL ASLEEP IN A CAR, WHILE STOPPED FOR A FEW MINUTES IN TRAFFIC: 0
HOW LIKELY ARE YOU TO NOD OFF OR FALL ASLEEP WHEN YOU ARE A PASSENGER IN A CAR FOR AN HOUR WITHOUT A BREAK: 1
HOW LIKELY ARE YOU TO NOD OFF OR FALL ASLEEP WHILE SITTING INACTIVE IN A PUBLIC PLACE: 1
HOW LIKELY ARE YOU TO NOD OFF OR FALL ASLEEP WHILE SITTING QUIETLY AFTER LUNCH WITHOUT ALCOHOL: 1
HOW LIKELY ARE YOU TO NOD OFF OR FALL ASLEEP WHILE SITTING AND READING: 1
ESS TOTAL SCORE: 8
HOW LIKELY ARE YOU TO NOD OFF OR FALL ASLEEP WHILE LYING DOWN TO REST IN THE AFTERNOON WHEN CIRCUMSTANCES PERMIT: 2
HOW LIKELY ARE YOU TO NOD OFF OR FALL ASLEEP WHILE SITTING AND TALKING TO SOMEONE: 1
HOW LIKELY ARE YOU TO NOD OFF OR FALL ASLEEP WHILE WATCHING TV: 1

## 2020-06-05 NOTE — PROGRESS NOTES
6/5/2020  sleep study  for Kassi Zhao  1940 is complete. Results are pending physician review.     Electronically signed by Casimiro Martins on 6/5/2020 at 5:42 AM

## 2020-06-10 LAB — STATUS: NORMAL

## 2020-06-10 PROCEDURE — 95810 POLYSOM 6/> YRS 4/> PARAM: CPT | Performed by: INTERNAL MEDICINE

## 2020-06-16 ENCOUNTER — TELEPHONE (OUTPATIENT)
Dept: PULMONOLOGY | Age: 80
End: 2020-06-16

## 2020-06-25 RX ORDER — LOSARTAN POTASSIUM 100 MG/1
TABLET ORAL
Qty: 90 TABLET | Refills: 1 | Status: SHIPPED | OUTPATIENT
Start: 2020-06-25 | End: 2020-12-10

## 2020-07-09 ENCOUNTER — TELEPHONE (OUTPATIENT)
Dept: PULMONOLOGY | Age: 80
End: 2020-07-09

## 2020-07-09 NOTE — TELEPHONE ENCOUNTER
LVM to set up compliance f/u. He was set up with Sean Jin on 7/1. He will need an appt for after 8/2.

## 2020-08-31 ENCOUNTER — OFFICE VISIT (OUTPATIENT)
Dept: PULMONOLOGY | Age: 80
End: 2020-08-31
Payer: MEDICARE

## 2020-08-31 VITALS
SYSTOLIC BLOOD PRESSURE: 122 MMHG | WEIGHT: 208 LBS | BODY MASS INDEX: 32.65 KG/M2 | DIASTOLIC BLOOD PRESSURE: 64 MMHG | HEART RATE: 82 BPM | HEIGHT: 67 IN | OXYGEN SATURATION: 96 %

## 2020-08-31 PROCEDURE — G8417 CALC BMI ABV UP PARAM F/U: HCPCS | Performed by: INTERNAL MEDICINE

## 2020-08-31 PROCEDURE — 99214 OFFICE O/P EST MOD 30 MIN: CPT | Performed by: INTERNAL MEDICINE

## 2020-08-31 PROCEDURE — G8427 DOCREV CUR MEDS BY ELIG CLIN: HCPCS | Performed by: INTERNAL MEDICINE

## 2020-08-31 ASSESSMENT — ENCOUNTER SYMPTOMS
COUGH: 0
EYE ITCHING: 0
BACK PAIN: 0
ABDOMINAL PAIN: 0
ABDOMINAL DISTENTION: 0
EYE DISCHARGE: 0
SHORTNESS OF BREATH: 0

## 2020-08-31 NOTE — PROGRESS NOTES
CAPSULE BY MOUTH ONCE DAILY 90 capsule 2     No current facility-administered medications for this visit.         Allergies   Allergen Reactions    Lyrica [Pregabalin]      Confusion, sleepiness       Past Medical History:   Diagnosis Date    Basal cell carcinoma of skin     Right Lower Eyelid    Cancer (HCC)     Cataract, bilateral     CKD (chronic kidney disease)     Family history of colon cancer     H/O Doppler ultrasound 10/31/2018    No evidence of AAA within the visualized portions of the abdominal aorta    H/O echocardiogram 2018    EF 55-60%    History of nuclear stress test 10/31/2018    Normal LV function    Hyperlipidemia     Hypertension     Macular hole, right eye     Obstructive sleep apnea (adult) (pediatric)     RLS (restless legs syndrome)     Type 2 diabetes mellitus with diabetic neuropathy (HCC)     Type 2 diabetes mellitus with hyperglycemia (HCC)     Unspecified dementia without behavioral disturbance ([facility] [address]        Past Surgical History:   Procedure Laterality Date    COLONOSCOPY  2012    sig diverticula-Recomm repeat in 5 yrs    MALIGNANT SKIN LESION EXCISION  10/26/2017    BCC excision with direct reconstruction right lower eyelid       Social History     Socioeconomic History    Marital status:      Spouse name: None    Number of children: None    Years of education: None    Highest education level: None   Occupational History    Occupation: Retired   Social Needs    Financial resource strain: None    Food insecurity     Worry: None     Inability: None    Transportation needs     Medical: None     Non-medical: None   Tobacco Use    Smoking status: Former Smoker     Packs/day: 1.00     Years: 35.00     Pack years: 35.00     Last attempt to quit: [address] [facility]     Years since quittin.6    Smokeless tobacco: Never Used   Substance and Sexual Activity    Alcohol use: No    Drug use: No    Sexual activity: None   Lifestyle    Physical activity Days per week: None     Minutes per session: None    Stress: None   Relationships    Social connections     Talks on phone: None     Gets together: None     Attends Yazdanism service: None     Active member of club or organization: None     Attends meetings of clubs or organizations: None     Relationship status: None    Intimate partner violence     Fear of current or ex partner: None     Emotionally abused: None     Physically abused: None     Forced sexual activity: None   Other Topics Concern    None   Social History Narrative    None       Review of Systems   Constitutional: Negative for fatigue. HENT: Negative for congestion and postnasal drip. Eyes: Negative for discharge and itching. Respiratory: Negative for cough and shortness of breath. Cardiovascular: Negative for chest pain and leg swelling. Gastrointestinal: Negative for abdominal distention and abdominal pain. Endocrine: Negative for cold intolerance and heat intolerance. Genitourinary: Negative for enuresis and flank pain. Musculoskeletal: Negative for arthralgias and back pain. Allergic/Immunologic: Negative for environmental allergies and food allergies. Neurological: Negative for light-headedness and headaches. Hematological: Negative for adenopathy. Psychiatric/Behavioral: Negative for agitation and behavioral problems. Objective:   /64   Pulse 82   Ht 5' 7\" (1.702 m)   Wt 208 lb (94.3 kg)   SpO2 96%   BMI 32.58 kg/m²   Body mass index is 32.58 kg/m².   Sleep Medicine 6/4/2020 3/23/2020   Sitting and reading 1 1   Watching TV 1 2   Sitting, inactive in a public place (e.g. a theatre or a meeting) 1 2   As a passenger in a car for an hour without a break 1 2   Lying down to rest in the afternoon when circumstances permit 2 2   Sitting and talking to someone 1 1   Sitting quietly after a lunch without alcohol 1 2   In a car, while stopped for a few minutes in traffic 0 1   Total score 8 13   Neck circumference 19 19     {MALLAMPATI:3    Physical Exam  Vitals signs reviewed. Constitutional:       Appearance: Normal appearance. Comments: Obesity   HENT:      Head: Normocephalic and atraumatic. Nose: Nose normal.      Mouth/Throat:      Mouth: Mucous membranes are moist.   Eyes:      Extraocular Movements: Extraocular movements intact. Pupils: Pupils are equal, round, and reactive to light. Neck:      Musculoskeletal: Normal range of motion and neck supple. Cardiovascular:      Rate and Rhythm: Normal rate and regular rhythm. Pulses: Normal pulses. Heart sounds: Normal heart sounds. Pulmonary:      Effort: Pulmonary effort is normal.      Breath sounds: Normal breath sounds. Abdominal:      General: Abdomen is flat. Palpations: Abdomen is soft. Musculoskeletal: Normal range of motion. Skin:     General: Skin is warm and dry. Neurological:      General: No focal deficit present. Mental Status: He is alert and oriented to person, place, and time. Psychiatric:         Mood and Affect: Mood normal.         Behavior: Behavior normal.         Radiology: None    Assessment and Plan     Problem List        Pulmonary Problems    DENISE (obstructive sleep apnea)     Advised to be compliant with the CPAP  Loose weight            Other    Obesity (BMI 30-39. 9)     Advised to loose weight with diet and exercise           Relevant Medications    glipiZIDE (GLUCOTROL) 5 MG tablet    insulin glargine (BASAGLAR KWIKPEN) 100 UNIT/ML injection pen    Dulaglutide (TRULICITY) 0.21 NZ/3.9HU SOPN    Hypersomnia     Advised to be compliant with the CPAP  Loose weight         Ex-smoker     Advised to c/w quitting smoking                    Return in about 1 year (around 8/31/2021) for 2 week download data.      Progress notes sent to the referring Provider    Noemi Samuels MD  8/31/2020  2:29 PM

## 2020-09-17 ENCOUNTER — OFFICE VISIT (OUTPATIENT)
Dept: INTERNAL MEDICINE CLINIC | Age: 80
End: 2020-09-17
Payer: MEDICARE

## 2020-09-17 VITALS
BODY MASS INDEX: 33.77 KG/M2 | TEMPERATURE: 96.8 F | SYSTOLIC BLOOD PRESSURE: 122 MMHG | HEART RATE: 90 BPM | DIASTOLIC BLOOD PRESSURE: 64 MMHG | WEIGHT: 215.6 LBS | OXYGEN SATURATION: 98 %

## 2020-09-17 PROCEDURE — G8427 DOCREV CUR MEDS BY ELIG CLIN: HCPCS | Performed by: FAMILY MEDICINE

## 2020-09-17 PROCEDURE — G8417 CALC BMI ABV UP PARAM F/U: HCPCS | Performed by: FAMILY MEDICINE

## 2020-09-17 PROCEDURE — 90694 VACC AIIV4 NO PRSRV 0.5ML IM: CPT | Performed by: FAMILY MEDICINE

## 2020-09-17 PROCEDURE — 99214 OFFICE O/P EST MOD 30 MIN: CPT | Performed by: FAMILY MEDICINE

## 2020-09-17 PROCEDURE — 90732 PPSV23 VACC 2 YRS+ SUBQ/IM: CPT | Performed by: FAMILY MEDICINE

## 2020-09-17 PROCEDURE — 1123F ACP DISCUSS/DSCN MKR DOCD: CPT | Performed by: FAMILY MEDICINE

## 2020-09-17 PROCEDURE — 1036F TOBACCO NON-USER: CPT | Performed by: FAMILY MEDICINE

## 2020-09-17 PROCEDURE — 4040F PNEUMOC VAC/ADMIN/RCVD: CPT | Performed by: FAMILY MEDICINE

## 2020-09-17 PROCEDURE — G0009 ADMIN PNEUMOCOCCAL VACCINE: HCPCS | Performed by: FAMILY MEDICINE

## 2020-09-17 PROCEDURE — G0008 ADMIN INFLUENZA VIRUS VAC: HCPCS | Performed by: FAMILY MEDICINE

## 2020-09-17 RX ORDER — PEN NEEDLE, DIABETIC 31 G X1/4"
NEEDLE, DISPOSABLE MISCELLANEOUS
COMMUNITY
Start: 2020-08-18 | End: 2021-12-07

## 2020-09-17 RX ORDER — DULAGLUTIDE 1.5 MG/.5ML
INJECTION, SOLUTION SUBCUTANEOUS
COMMUNITY
Start: 2020-08-04 | End: 2020-09-17

## 2020-09-17 ASSESSMENT — ENCOUNTER SYMPTOMS
NAUSEA: 0
BACK PAIN: 0
CHEST TIGHTNESS: 0
CONSTIPATION: 0
DIARRHEA: 0
SHORTNESS OF BREATH: 0
ABDOMINAL PAIN: 0
COUGH: 0

## 2020-09-17 NOTE — PROGRESS NOTES
Have you ever had a reaction to a flu vaccine? NO  Are you able to eat eggs without adverse effects? YES  Do you have any current illness? NO  Have you ever had [place] Syndrome? NO    Flu vaccine given per order. Please see immunization tab.

## 2020-09-17 NOTE — PROGRESS NOTES
Theresa Flower  1940  [de-identified] y.o.  male    Chief Complaint   Patient presents with    6 Month Follow-Up         History of Present Illness  Theresa Flower is a [de-identified] y.o. male who presents today for HTN, HLD, RLS, Dementia-mild, diabetic neuropathy. He continues to follow with endocrinology for his diabetes. Last Hba1c 7.2. No results for lipids. Stable on medications. He has seen Dr. Rosie Duran for his diabetic foot care. CKD managed per nephrology. Patient Active Problem List    Diagnosis Date Noted    DENISE (obstructive sleep apnea) 03/23/2020    Obesity (BMI 30-39.9) 03/23/2020    Hypersomnia 03/23/2020    Ex-smoker 03/23/2020    Type 2 diabetes mellitus without complication, with long-term current use of insulin ([facility] [address] 01/24/2020    Essential hypertension 01/24/2020    Persistent proteinuria 01/24/2020    Chronic kidney disease, stage III (moderate) ([facility] [address] 01/24/2020    Vascular dementia without behavioral disturbance ([facility] [address] 10/76/7667    Diastolic dysfunction 12/15/1921    Diabetic polyneuropathy associated with type 2 diabetes mellitus ([facility] [address] 09/08/2019       Review of Systems   Constitutional: Negative for diaphoresis and fever. HENT: Negative. Respiratory: Negative for cough, chest tightness and shortness of breath. Cardiovascular: Negative for chest pain and palpitations. Gastrointestinal: Negative for abdominal pain, constipation, diarrhea and nausea. Genitourinary: Negative for difficulty urinating. Musculoskeletal: Negative for back pain. Neurological: Negative for dizziness and headaches. Psychiatric/Behavioral: Negative for dysphoric mood.        Allergies   Allergen Reactions    Lyrica [Pregabalin]      Confusion, sleepiness       Past Medical History:   Diagnosis Date    Basal cell carcinoma of skin     Right Lower Eyelid    Cancer (HCC)     Cataract, bilateral     CKD (chronic kidney disease)     Family history of colon cancer     H/O Doppler ultrasound 10/31/2018 No evidence of AAA within the visualized portions of the abdominal aorta    H/O echocardiogram 2018    EF 55-60%    History of nuclear stress test 10/31/2018    Normal LV function    Hyperlipidemia     Hypertension     Macular hole, right eye     Obstructive sleep apnea (adult) (pediatric)     RLS (restless legs syndrome)     Type 2 diabetes mellitus with diabetic neuropathy (HCC)     Type 2 diabetes mellitus with hyperglycemia (HCC)     Unspecified dementia without behavioral disturbance (HCC)        Past Surgical History:   Procedure Laterality Date    COLONOSCOPY  2012    sig diverticula-Recomm repeat in 5 yrs    MALIGNANT SKIN LESION EXCISION  10/26/2017    BCC excision with direct reconstruction right lower eyelid       Family History   Problem Relation Age of Onset    Heart Attack Mother     Heart Attack Father        Social History     Tobacco Use    Smoking status: Former Smoker     Packs/day: 1.00     Years: 35.00     Pack years: 35.00     Last attempt to quit:      Years since quittin.    Smokeless tobacco: Never Used   Substance Use Topics    Alcohol use: No    Drug use: No       Current Outpatient Medications   Medication Sig Dispense Refill    ULTRACARE PEN NEEDLES 31G X 5 MM MISC       Folinic Acid-Vit B6-Vit B12 (FOLINIC-PLUS PO) Take 1 tablet by mouth daily      losartan (COZAAR) 100 MG tablet TAKE 1 TABLET BY MOUTH ONCE DAILY 90 tablet 1    pramipexole (MIRAPEX) 0.5 MG tablet TAKE 1 TABLET BY MOUTH NIGHTLY 90 tablet 1    donepezil (ARICEPT) 5 MG tablet TAKE ONE (1) TABLET BY MOUTH EVERY NIGHT 90 tablet 2    gabapentin (NEURONTIN) 300 MG capsule TAKE ONE (1) CAPSULE BY MOUTH ONCE DAILY 90 capsule 2    hydroCHLOROthiazide (MICROZIDE) 12.5 MG capsule TAKE ONE (1) CAPSULE BY MOUTH ONCE DAILY 90 capsule 2    metoprolol succinate (TOPROL XL) 50 MG extended release tablet TAKE ONE (1) TABLET BY MOUTH ONCE DAILY 90 tablet 2    pravastatin (PRAVACHOL) 40 MG

## 2020-10-06 PROBLEM — N18.4 CHRONIC KIDNEY DISEASE, STAGE IV (SEVERE) (HCC): Status: ACTIVE | Noted: 2020-10-06

## 2020-10-13 ENCOUNTER — TELEPHONE (OUTPATIENT)
Dept: PULMONOLOGY | Age: 80
End: 2020-10-13

## 2020-11-03 ENCOUNTER — TELEPHONE (OUTPATIENT)
Dept: PULMONOLOGY | Age: 80
End: 2020-11-03

## 2020-11-11 RX ORDER — PRAMIPEXOLE DIHYDROCHLORIDE 0.5 MG/1
TABLET ORAL
Qty: 90 TABLET | Refills: 1 | Status: SHIPPED | OUTPATIENT
Start: 2020-11-11 | End: 2021-04-20 | Stop reason: SDUPTHER

## 2020-12-10 RX ORDER — LOSARTAN POTASSIUM 100 MG/1
TABLET ORAL
Qty: 90 TABLET | Refills: 1 | Status: SHIPPED | OUTPATIENT
Start: 2020-12-10 | End: 2021-06-24 | Stop reason: SDUPTHER

## 2020-12-17 ENCOUNTER — TELEPHONE (OUTPATIENT)
Dept: INTERNAL MEDICINE CLINIC | Age: 80
End: 2020-12-17

## 2020-12-17 NOTE — TELEPHONE ENCOUNTER
Patient's daughter Arjun Stoner) called stating she has some concerns for her father. She states the patient is making some very inappropriate comments towards family and friends; trying to get them to send nude pictures, as well as watching pornography while family is around. Sebastián Isaac stated this is very out of character for her father (which he is declining) and it is causing issues between her mother and father. She continued saying that her aunt was on the same medication as her father for dementia and it caused some of the same issues. So she is wanting to know if there was any advice, medication or something that can help?

## 2021-01-11 RX ORDER — DONEPEZIL HYDROCHLORIDE 5 MG/1
TABLET, FILM COATED ORAL
Qty: 90 TABLET | Refills: 1 | Status: SHIPPED | OUTPATIENT
Start: 2021-01-11 | End: 2021-01-13

## 2021-01-11 RX ORDER — HYDROCHLOROTHIAZIDE 12.5 MG/1
CAPSULE, GELATIN COATED ORAL
Qty: 90 CAPSULE | Refills: 1 | Status: SHIPPED | OUTPATIENT
Start: 2021-01-11 | End: 2021-04-01 | Stop reason: SDUPTHER

## 2021-01-11 RX ORDER — GABAPENTIN 300 MG/1
CAPSULE ORAL
Qty: 90 CAPSULE | Refills: 1 | Status: SHIPPED | OUTPATIENT
Start: 2021-01-11 | End: 2021-04-20 | Stop reason: SDUPTHER

## 2021-01-11 RX ORDER — PRAVASTATIN SODIUM 40 MG
TABLET ORAL
Qty: 90 TABLET | Refills: 1 | Status: SHIPPED | OUTPATIENT
Start: 2021-01-11 | End: 2021-06-25

## 2021-01-13 ENCOUNTER — OFFICE VISIT (OUTPATIENT)
Dept: INTERNAL MEDICINE CLINIC | Age: 81
End: 2021-01-13
Payer: MEDICARE

## 2021-01-13 VITALS
TEMPERATURE: 97.1 F | SYSTOLIC BLOOD PRESSURE: 110 MMHG | BODY MASS INDEX: 33.05 KG/M2 | OXYGEN SATURATION: 98 % | DIASTOLIC BLOOD PRESSURE: 70 MMHG | WEIGHT: 211 LBS | HEART RATE: 77 BPM

## 2021-01-13 DIAGNOSIS — F03.91 DEMENTIA WITH BEHAVIORAL DISTURBANCE, UNSPECIFIED DEMENTIA TYPE: Primary | ICD-10-CM

## 2021-01-13 DIAGNOSIS — F41.9 ANXIETY: ICD-10-CM

## 2021-01-13 PROCEDURE — 1036F TOBACCO NON-USER: CPT | Performed by: FAMILY MEDICINE

## 2021-01-13 PROCEDURE — 4040F PNEUMOC VAC/ADMIN/RCVD: CPT | Performed by: FAMILY MEDICINE

## 2021-01-13 PROCEDURE — G8417 CALC BMI ABV UP PARAM F/U: HCPCS | Performed by: FAMILY MEDICINE

## 2021-01-13 PROCEDURE — G8484 FLU IMMUNIZE NO ADMIN: HCPCS | Performed by: FAMILY MEDICINE

## 2021-01-13 PROCEDURE — 99213 OFFICE O/P EST LOW 20 MIN: CPT | Performed by: FAMILY MEDICINE

## 2021-01-13 PROCEDURE — 1123F ACP DISCUSS/DSCN MKR DOCD: CPT | Performed by: FAMILY MEDICINE

## 2021-01-13 PROCEDURE — G8427 DOCREV CUR MEDS BY ELIG CLIN: HCPCS | Performed by: FAMILY MEDICINE

## 2021-01-13 RX ORDER — DONEPEZIL HYDROCHLORIDE 10 MG/1
10 TABLET, FILM COATED ORAL NIGHTLY
Qty: 90 TABLET | Refills: 1 | Status: SHIPPED | OUTPATIENT
Start: 2021-01-13 | End: 2021-06-23

## 2021-01-13 ASSESSMENT — ENCOUNTER SYMPTOMS
COUGH: 0
ABDOMINAL PAIN: 0
SHORTNESS OF BREATH: 0
CHEST TIGHTNESS: 0
DIARRHEA: 0
CONSTIPATION: 0
NAUSEA: 0
BACK PAIN: 0

## 2021-01-13 NOTE — PROGRESS NOTES
Armin Child  1940  [de-identified] y.o.  male    Chief Complaint   Patient presents with    Check-Up         History of Present Illness  Armin Child is a [de-identified] y.o. male who presents today with his wife for a follow up. Asuncion-daughter via phone call during visit. She orginially made the appointment. +Dementia on Aricept. He has some new behaviors that are concerning to his family. He has been inappropriate with family members of a sexual nature. He has been watching pornography while family is around per [place]. He states he is aware of that this inappropriate. His wife is ill, and he has ED. He plans on following up with urology. He has some anxiety and has been started on Celexa per nephrology. He otherwise has been stable without any other issues. Review of Systems   Constitutional: Negative for diaphoresis and fever. Respiratory: Negative for cough, chest tightness and shortness of breath. Cardiovascular: Negative for chest pain and palpitations. Gastrointestinal: Negative for abdominal pain, constipation, diarrhea and nausea. Genitourinary: Negative for difficulty urinating. Musculoskeletal: Negative for back pain. Neurological: Negative for dizziness and headaches. Psychiatric/Behavioral: Positive for behavioral problems. Negative for dysphoric mood.        Allergies   Allergen Reactions    Lyrica [Pregabalin]      Confusion, sleepiness       Past Medical History:   Diagnosis Date    Basal cell carcinoma of skin     Right Lower Eyelid    Cancer (HCC)     Cataract, bilateral     CKD (chronic kidney disease)     Family history of colon cancer     H/O Doppler ultrasound 10/31/2018    No evidence of AAA within the visualized portions of the abdominal aorta    H/O echocardiogram 11/14/2018    EF 55-60%    History of nuclear stress test 10/31/2018    Normal LV function    Hyperlipidemia     Hypertension     Macular hole, right eye     Obstructive sleep apnea (adult) (pediatric)  RLS (restless legs syndrome)     Type 2 diabetes mellitus with diabetic neuropathy (HCC)     Type 2 diabetes mellitus with hyperglycemia (HCC)     Unspecified dementia without behavioral disturbance (HCC)        Past Surgical History:   Procedure Laterality Date    COLONOSCOPY  2012    sig diverticula-Recomm repeat in 5 yrs    MALIGNANT SKIN LESION EXCISION  10/26/2017    BCC excision with direct reconstruction right lower eyelid       Family History   Problem Relation Age of Onset    Heart Attack Mother     Heart Attack Father        Social History     Tobacco Use    Smoking status: Former Smoker     Packs/day: 1.00     Years: 35.00     Pack years: 35.00     Quit date:      Years since quittin.0    Smokeless tobacco: Never Used   Substance Use Topics    Alcohol use: No    Drug use: No       Current Outpatient Medications   Medication Sig Dispense Refill    donepezil (ARICEPT) 10 MG tablet Take 1 tablet by mouth nightly 90 tablet 1    gabapentin (NEURONTIN) 300 MG capsule TAKE ONE (1) CAPSULE BY MOUTH ONCE DAILY 90 capsule 1    hydroCHLOROthiazide (MICROZIDE) 12.5 MG capsule TAKE ONE (1) CAPSULE BY MOUTH ONCE DAILY 90 capsule 1    pravastatin (PRAVACHOL) 40 MG tablet TAKE ONE (1) TABLET BY MOUTH ONCE DAILY 90 tablet 1    losartan (COZAAR) 100 MG tablet TAKE 1 TABLET BY MOUTH ONCE DAILY 90 tablet 1    citalopram (CELEXA) 10 MG tablet TAKE ONE (1) TABLET BY MOUTH ONCE DAILY 90 tablet 2    pramipexole (MIRAPEX) 0.5 MG tablet TAKE 1 TABLET BY MOUTH AT BEDTIME 90 tablet 1    ULTRACARE PEN NEEDLES 31G X 5 MM MISC       Folinic Acid-Vit B6-Vit B12 (FOLINIC-PLUS PO) Take 1 tablet by mouth daily      metoprolol succinate (TOPROL XL) 50 MG extended release tablet TAKE ONE (1) TABLET BY MOUTH ONCE DAILY 90 tablet 2    insulin glargine (BASAGLAR KWIKPEN) 100 UNIT/ML injection pen Inject 25-50 Units into the skin nightly Referral to Dr. Mari-Neurology  Referral to Dr. Gomez-Psychology  Pt to drop off recent lab results  Increase Aricept to 10 mg. May need to change if no improvement  ADR's explained  Keep f/u with specialists  Persist RTO or call           Return for Keep f/u as scheduled.     Electronically Signed by Bryon Mccray DO

## 2021-01-20 ENCOUNTER — VIRTUAL VISIT (OUTPATIENT)
Dept: PSYCHOLOGY | Age: 81
End: 2021-01-20
Payer: MEDICARE

## 2021-01-20 DIAGNOSIS — F01.518 VASCULAR DEMENTIA WITH BEHAVIOR DISTURBANCE: Primary | ICD-10-CM

## 2021-01-20 PROCEDURE — 90791 PSYCH DIAGNOSTIC EVALUATION: CPT | Performed by: PSYCHOLOGIST

## 2021-01-20 NOTE — Clinical Note
Given the concerns of his wife and daughter, could stop celexa and start SSRI with greater probability of decreasing sexual desire. Note Paxil is SSRI w greatest likelihood of reducing sexual desire. The occurrence of inappropriate behaviors in individuals w dementia reportedly ranges from as low as 7% to as high as 25 %. This was discussed w pt's daughter, who suggested pt's wife may benefit from having these behaviors normalized. Also discussed healthy sexual expression w pt's daughter and considered various environmental interventions. Will follow w pt and wife in office to discuss behaviors and further discuss environmental/behavioral recommendations.

## 2021-01-20 NOTE — PROGRESS NOTES
Patient Location: Home       Provider Location ([facility]/State): [place]       This virtual visit was conducted via interactive/real-time audio/video. Pursuant to the emergency declaration under the [facility] waiver authority and the [employer] Act, this Virtual  Visit was conducted, with patient's consent, to reduce the patient's risk of exposure to COVID-19 and provide continuity of care for an established patient. Services were provided through a video synchronous discussion virtually to substitute for in-person clinic visit. Additionally, this provider made reasonable effort to verify identify of patient, conducted risk benefit analysis and have determined patient's presenting problem and condition are consistent with the use of telepsychology to patient's benefit, ensured pt has access, knowledge, and skills required to use required technology, obtained alternative means of contacting patient, provided pt with alternative means of contacting provider, reviewed informed consent and obtained verbal agreement in lieu of written informed consent, as such is rendered impossible due to the unexpected nature secondary to COVID-19 clinical recommendations. Behavioral Health Consultation  Joe Olivares Psy.D. Psychologist    Time spent with Patient: 30 minutes  Visit number: 1  Reason for Consult:  bx concerns   Referring Provider: Mayra Salvador DO  [address],  [address] [facility]    Informed consent:  Pt provided informed consent for the behavioral health program. Discussed with patient model of service to include the limits of confidentiality (i.e. abuse reporting, suicide intervention, etc.) and focused intervention approach. Pt indicated understanding.     S:  ---------------------------------------------------------------------------------------------------------------------- Presenting problem:  Dementia and anxiety  Spoke w pt on phone d/t technology concerns. Pt's wife nearby, but did not speak w provider directly. Pt is poor historian. Pt unable to explain presenting problem clearly. Pt unable to identify any particular concerns. Per pt, \"I guess I made some comments that weren't appropriate and somebody took offense at it. \" Explained he made sexual comments to \"family, friends, and some people on facebook. \" Chart review indicates pt viewed pornography on television while family was present. Wife overheard agreeing this occurred. With verbal consent from pt, this provider spoke w pt's daughter, Kaylyn Bentley. Per Yordan Gio a year ago or so I found out he was talking with women and I figured he was pretty lonely. \" She learned he was speaking with much younger women. Also watching more pornography. These behaviors did not represent cause for concern themselves, however in the past year she reported Jim Govea has sent text message to niece and Kaylyn Bentley herself requesting nude photos. Social:    57 years. Two adult children. Substance Use:   EtOH: 1x/week 1 drink   Cannabis: denied   Tobacco: denied    Other: denied    Psychiatric tx hx:    Psych meds:  Aricept.  celexa  Outpatient psychotherapy:  denied   Inpatient psych hospitalizations:  Denied     Abuse hx:  denied    Relevant medical conditions/concerns:  Dementia       O:  ----------------------------------------------------------------------------------------------------------------------  MSE:    Orientation:  oriented to person, place, time/date, month of year and year  Appearance and behavior:  alert, cooperative  Speech:  spontaneous, normal rate and normal volume  Mood: irritable   Thought Content:  concrete  Thought Process:  tangential and circumstantial  Interest/Pleasure: Normal  Sleep disturbance: Yes  Motivation: Poor  Energy: Tired/Fatigued  Morbid ideation No  Suicide Assessment: no suicidal ideation A:  ----------------------------------------------------------------------------------------------------------------------  Diagnosis:    1. Vascular dementia with behavior disturbance (HCC)         PHQ Scores 3/12/2020 3/5/2019   PHQ2 Score 0 0   PHQ9 Score 0 0     Interpretation of Total Score Depression Severity: 1-4 = Minimal depression, 5-9 = Mild depression, 10-14 = Moderate depression, 15-19 = Moderately severe depression, 20-27 = Severe depression    P:  ----------------------------------------------------------------------------------------------------------------------    The occurrence of inappropriate behaviors in individuals w dementia reportedly ranges from as low as 7% to as high as 25 %. This was discussed w pt's daughter, who suggested pt's wife may benefit from having these behaviors normalized. Also discussed healthy sexual expression w pt's daughter and considered various environmental interventions. Will follow w pt and wife in office to discuss behaviors and further discuss environmental/behavioral recommendations. wnl for pt? Need to establish baseline. Paxil? [x]Discussed potential treatments for   1. Vascular dementia with behavior disturbance ([facility]       [x]Conducted functional assessment  [x]Established rapport  [x]Supportive interventions   [x][place]-setting to identify pt's primary goals for [facility] visit / overall health  [x]Provided psychoeducation/handout on   1. Vascular dementia with behavior disturbance ([address]            Other:   []     Pt Behavioral Change Plan:    1.   2.      This note will not be viewable in [facility] for the following reason(s). This is a Psychotherapy Note.             Feedback provided to pt's PCP via EPIC and/or oral report

## 2021-02-25 ENCOUNTER — OFFICE VISIT (OUTPATIENT)
Dept: PSYCHOLOGY | Age: 81
End: 2021-02-25
Payer: MEDICARE

## 2021-02-25 VITALS — TEMPERATURE: 96.9 F

## 2021-02-25 DIAGNOSIS — F01.518 VASCULAR DEMENTIA WITH BEHAVIOR DISTURBANCE: Primary | ICD-10-CM

## 2021-02-25 DIAGNOSIS — F03.91 DEMENTIA WITH BEHAVIORAL DISTURBANCE, UNSPECIFIED DEMENTIA TYPE: ICD-10-CM

## 2021-02-25 PROCEDURE — 90847 FAMILY PSYTX W/PT 50 MIN: CPT | Performed by: PSYCHOLOGIST

## 2021-02-25 NOTE — PROGRESS NOTES
Behavioral Health Consultation  Joe Villagran Psy.D. Psychologist      Time spent with Patient: 30 minutes  Visit number: 2  Reason for Consult:  Dementia   Referring Provider: DO Kleber Jessica [address] [facility],  [address] [facility]    S:  ----------------------------------------------------------------------------------------------------------------------  Dementia   Per Reddy Stake just want to make sure he's safe. \" Explained he has been making new friends online. He denied sending any money to anyone. Daughter stated sexual acting out has mostly ceased at this time--None in past month. Sleep has been stable, however wife reports he is talking in his sleep more often. Feels rested when he wakes. Denied any depression. Denied any anxiety. Some distress around lack of sexual activity w his wife. Seen today w youngest daughter, Jimbo Elizondo:  ----------------------------------------------------------------------------------------------------------------------  MSE:  Orientation:  oriented to person, place, time/date, month of year and year  Appearance and behavior:  alert, cooperative  Speech:  spontaneous, normal rate and normal volume  Mood: irritable   Thought Content:  concrete  Thought Process:  tangential and circumstantial  Interest/Pleasure: Normal  Sleep disturbance: Yes  Motivation: Poor  Energy: Tired/Fatigued  Morbid ideation No  Suicide Assessment: no suicidal ideation    A:  ----------------------------------------------------------------------------------------------------------------------  Diagnosis:    1. Vascular dementia with behavior disturbance ([facility]    2.  Dementia with behavioral disturbance, unspecified dementia type ([facility]         PHQ Scores 3/12/2020 3/5/2019   PHQ2 Score 0 0   PHQ9 Score 0 0

## 2021-03-02 RX ORDER — METOPROLOL SUCCINATE 50 MG/1
TABLET, EXTENDED RELEASE ORAL
Qty: 90 TABLET | Refills: 1 | Status: SHIPPED | OUTPATIENT
Start: 2021-03-02 | End: 2021-09-15 | Stop reason: SDUPTHER

## 2021-03-18 ENCOUNTER — OFFICE VISIT (OUTPATIENT)
Dept: INTERNAL MEDICINE CLINIC | Age: 81
End: 2021-03-18
Payer: MEDICARE

## 2021-03-18 VITALS
WEIGHT: 213 LBS | TEMPERATURE: 96.9 F | BODY MASS INDEX: 33.36 KG/M2 | DIASTOLIC BLOOD PRESSURE: 80 MMHG | SYSTOLIC BLOOD PRESSURE: 130 MMHG | OXYGEN SATURATION: 98 % | HEART RATE: 66 BPM

## 2021-03-18 DIAGNOSIS — F03.91 DEMENTIA WITH BEHAVIORAL DISTURBANCE, UNSPECIFIED DEMENTIA TYPE: ICD-10-CM

## 2021-03-18 DIAGNOSIS — E11.42 DIABETIC POLYNEUROPATHY ASSOCIATED WITH TYPE 2 DIABETES MELLITUS (HCC): Primary | ICD-10-CM

## 2021-03-18 DIAGNOSIS — I10 ESSENTIAL HYPERTENSION: ICD-10-CM

## 2021-03-18 DIAGNOSIS — E78.5 HYPERLIPIDEMIA, UNSPECIFIED HYPERLIPIDEMIA TYPE: ICD-10-CM

## 2021-03-18 PROCEDURE — 99214 OFFICE O/P EST MOD 30 MIN: CPT | Performed by: FAMILY MEDICINE

## 2021-03-18 PROCEDURE — 1036F TOBACCO NON-USER: CPT | Performed by: FAMILY MEDICINE

## 2021-03-18 PROCEDURE — G8484 FLU IMMUNIZE NO ADMIN: HCPCS | Performed by: FAMILY MEDICINE

## 2021-03-18 PROCEDURE — 4040F PNEUMOC VAC/ADMIN/RCVD: CPT | Performed by: FAMILY MEDICINE

## 2021-03-18 PROCEDURE — G8417 CALC BMI ABV UP PARAM F/U: HCPCS | Performed by: FAMILY MEDICINE

## 2021-03-18 PROCEDURE — G8427 DOCREV CUR MEDS BY ELIG CLIN: HCPCS | Performed by: FAMILY MEDICINE

## 2021-03-18 PROCEDURE — 1123F ACP DISCUSS/DSCN MKR DOCD: CPT | Performed by: FAMILY MEDICINE

## 2021-03-18 ASSESSMENT — PATIENT HEALTH QUESTIONNAIRE - PHQ9
SUM OF ALL RESPONSES TO PHQ QUESTIONS 1-9: 0
1. LITTLE INTEREST OR PLEASURE IN DOING THINGS: 0
SUM OF ALL RESPONSES TO PHQ QUESTIONS 1-9: 0
SUM OF ALL RESPONSES TO PHQ QUESTIONS 1-9: 0

## 2021-03-18 ASSESSMENT — ENCOUNTER SYMPTOMS
CONSTIPATION: 0
CHEST TIGHTNESS: 0
BACK PAIN: 0
COUGH: 0
NAUSEA: 0
DIARRHEA: 0
SHORTNESS OF BREATH: 0
ABDOMINAL PAIN: 0

## 2021-03-18 NOTE — PROGRESS NOTES
Reji Lawton  1940  80 y.o.  male    Chief Complaint   Patient presents with    6 Month Follow-Up    Diabetes         History of Present Illness  Reji Lawton is a 80 y.o. male who presents today for a check up. Patient Active Problem List    Diagnosis Date Noted    Chronic kidney disease, stage IV (severe) ([facility] 10/06/2020    DENISE (obstructive sleep apnea) 03/23/2020    Obesity (BMI 30-39.9) 03/23/2020    Hypersomnia 03/23/2020    Ex-smoker 03/23/2020    Type 2 diabetes mellitus without complication, with long-term current use of insulin ([facility] 01/24/2020    Essential hypertension 01/24/2020    Persistent proteinuria 01/24/2020    Chronic kidney disease, stage III (moderate) 01/24/2020    Vascular dementia without behavioral disturbance ([facility] 27/55/2426    Diastolic dysfunction 65/93/0151    Diabetic polyneuropathy associated with type 2 diabetes mellitus ([facility] 09/08/2019     -DM II with neuropathy- Last Hba1c 7.2- He follows with Dr. Mitchell Sherman. He is on Gabapentin for neuropathy- stable  -HTN and HLD- stable  -Dementia-mild- stable on Aricept  -CKD- Follows with nephrology  -RLS- On Pramipexole    Review of Systems   Constitutional: Negative for diaphoresis and fever. Respiratory: Negative for cough, chest tightness and shortness of breath. Cardiovascular: Negative for chest pain and palpitations. Gastrointestinal: Negative for abdominal pain, constipation, diarrhea and nausea. Genitourinary: Negative for difficulty urinating. Musculoskeletal: Negative for back pain. Neurological: Negative for dizziness and headaches. Psychiatric/Behavioral: Negative for dysphoric mood.        Allergies   Allergen Reactions    Lyrica [Pregabalin]      Confusion, sleepiness       Past Medical History:   Diagnosis Date    Basal cell carcinoma of skin     Right Lower Eyelid    Cancer (HCC)     Cataract, bilateral     CKD (chronic kidney disease)     Family history of colon cancer     H/O Doppler ultrasound 10/31/2018    No evidence of AAA within the visualized portions of the abdominal aorta    H/O echocardiogram 2018    EF 55-60%    History of nuclear stress test 10/31/2018    Normal LV function    Hyperlipidemia     Hypertension     Macular hole, right eye     Obstructive sleep apnea (adult) (pediatric)     RLS (restless legs syndrome)     Type 2 diabetes mellitus with diabetic neuropathy (HCC)     Type 2 diabetes mellitus with hyperglycemia (HCC)     Unspecified dementia without behavioral disturbance (HCC)        Past Surgical History:   Procedure Laterality Date    COLONOSCOPY  2012    sig diverticula-Recomm repeat in 5 yrs    MALIGNANT SKIN LESION EXCISION  10/26/2017    BCC excision with direct reconstruction right lower eyelid       Family History   Problem Relation Age of Onset    Heart Attack Mother     Heart Attack Father        Social History     Tobacco Use    Smoking status: Former Smoker     Packs/day: 1.00     Years: 35.00     Pack years: 35.00     Quit date:      Years since quittin.2    Smokeless tobacco: Never Used   Substance Use Topics    Alcohol use: No    Drug use: No       Current Outpatient Medications   Medication Sig Dispense Refill    metoprolol succinate (TOPROL XL) 50 MG extended release tablet TAKE ONE (1) TABLET BY MOUTH ONCE DAILY 90 tablet 1    donepezil (ARICEPT) 10 MG tablet Take 1 tablet by mouth nightly 90 tablet 1    hydroCHLOROthiazide (MICROZIDE) 12.5 MG capsule TAKE ONE (1) CAPSULE BY MOUTH ONCE DAILY 90 capsule 1    pravastatin (PRAVACHOL) 40 MG tablet TAKE ONE (1) TABLET BY MOUTH ONCE DAILY 90 tablet 1    losartan (COZAAR) 100 MG tablet TAKE 1 TABLET BY MOUTH ONCE DAILY 90 tablet 1    citalopram (CELEXA) 10 MG tablet TAKE ONE (1) TABLET BY MOUTH ONCE DAILY 90 tablet 2    pramipexole (MIRAPEX) 0.5 MG tablet TAKE 1 TABLET BY MOUTH AT BEDTIME 90 tablet 1    ULTRACARE PEN NEEDLES 31G X 5 MM MISC       Folinic Acid-Vit B6-Vit B12 (FOLINIC-PLUS PO) Take 1 tablet by mouth daily      insulin glargine (BASAGLAR KWIKPEN) 100 UNIT/ML injection pen Inject 25-50 Units into the skin nightly       Cholecalciferol (VITAMIN D3) 2000 units TABS Take by mouth      aspirin 81 MG tablet Take 81 mg by mouth daily      glipiZIDE (GLUCOTROL) 5 MG tablet Take 10 mg by mouth daily       gabapentin (NEURONTIN) 300 MG capsule TAKE ONE (1) CAPSULE BY MOUTH ONCE DAILY 90 capsule 1     No current facility-administered medications for this visit. OBJECTIVE:    /80   Pulse 66   Temp 96.9 °F (36.1 °C)   Wt 213 lb (96.6 kg)   SpO2 98%   BMI 33.36 kg/m²     Physical Exam  Vitals signs reviewed. Constitutional:       General: He is not in acute distress. Eyes:      Conjunctiva/sclera: Conjunctivae normal.   Neck:      Musculoskeletal: Neck supple. Cardiovascular:      Rate and Rhythm: Normal rate and regular rhythm. Pulmonary:      Effort: Pulmonary effort is normal. No respiratory distress. Breath sounds: Normal breath sounds. Abdominal:      General: Bowel sounds are normal.      Palpations: Abdomen is soft. Tenderness: There is no abdominal tenderness. Musculoskeletal:      Right lower leg: No edema. Left lower leg: No edema. Neurological:      Mental Status: He is alert and oriented to person, place, and time. Cranial Nerves: No cranial nerve deficit. Psychiatric:         Mood and Affect: Mood normal.         ASSESSMENT:  1. Diabetic polyneuropathy associated with type 2 diabetes mellitus ([facility]    2. Dementia with behavioral disturbance, unspecified dementia type ([facility]    3. Essential hypertension    4. Hyperlipidemia, unspecified hyperlipidemia type        PLAN:  Labs reviewed   Continue medications  The patient is asked to make an attempt to improve diet and exercise patterns   Keep f/u with specialists         Return in about 6 months (around 9/18/2021) for Check up.     Electronically Signed by Chemo Cordova, DO

## 2021-03-30 ENCOUNTER — OFFICE VISIT (OUTPATIENT)
Dept: INTERNAL MEDICINE CLINIC | Age: 81
End: 2021-03-30
Payer: MEDICARE

## 2021-03-30 VITALS
BODY MASS INDEX: 33.2 KG/M2 | HEART RATE: 56 BPM | WEIGHT: 212 LBS | DIASTOLIC BLOOD PRESSURE: 79 MMHG | OXYGEN SATURATION: 97 % | TEMPERATURE: 96.9 F | RESPIRATION RATE: 18 BRPM | SYSTOLIC BLOOD PRESSURE: 151 MMHG

## 2021-03-30 DIAGNOSIS — I10 ESSENTIAL HYPERTENSION: Primary | ICD-10-CM

## 2021-03-30 DIAGNOSIS — Z79.899 LONG TERM USE OF DRUG: ICD-10-CM

## 2021-03-30 DIAGNOSIS — E78.5 HYPERLIPIDEMIA, UNSPECIFIED HYPERLIPIDEMIA TYPE: ICD-10-CM

## 2021-03-30 DIAGNOSIS — F41.9 ANXIETY: ICD-10-CM

## 2021-03-30 PROCEDURE — 1123F ACP DISCUSS/DSCN MKR DOCD: CPT | Performed by: FAMILY MEDICINE

## 2021-03-30 PROCEDURE — 4040F PNEUMOC VAC/ADMIN/RCVD: CPT | Performed by: FAMILY MEDICINE

## 2021-03-30 PROCEDURE — 99214 OFFICE O/P EST MOD 30 MIN: CPT | Performed by: FAMILY MEDICINE

## 2021-03-30 PROCEDURE — G8484 FLU IMMUNIZE NO ADMIN: HCPCS | Performed by: FAMILY MEDICINE

## 2021-03-30 PROCEDURE — G8417 CALC BMI ABV UP PARAM F/U: HCPCS | Performed by: FAMILY MEDICINE

## 2021-03-30 PROCEDURE — G8427 DOCREV CUR MEDS BY ELIG CLIN: HCPCS | Performed by: FAMILY MEDICINE

## 2021-03-30 PROCEDURE — 1036F TOBACCO NON-USER: CPT | Performed by: FAMILY MEDICINE

## 2021-03-30 ASSESSMENT — ENCOUNTER SYMPTOMS
ABDOMINAL PAIN: 0
COUGH: 0
BACK PAIN: 0
NAUSEA: 0
SHORTNESS OF BREATH: 0
CHEST TIGHTNESS: 0

## 2021-03-30 NOTE — PROGRESS NOTES
Yue Adkins  1940  80 y.o.  male    Chief Complaint   Patient presents with    Hypertension     Pt c/o elevated BP, readings between 150//84. History of Present Illness  Yue Adkins is a 80 y.o. male who presents today for elevated Bp pressure. Pt with HTN and CKD 4. He has seen nephology and Bp was better. He states Bp was up at his podiatrist office. The Bp has fluctuated up and down. His daughter states he could have some anxiety regarding his Liddie Pont. No headache. Cp or Sob. +HLD-He did not do last lipid panel. He has follow up with cardiology. Review of Systems   Constitutional: Negative for diaphoresis and fever. Respiratory: Negative for cough, chest tightness and shortness of breath. Cardiovascular: Negative for chest pain and palpitations. Gastrointestinal: Negative for abdominal pain and nausea. Genitourinary: Negative for difficulty urinating. Musculoskeletal: Negative for back pain. Neurological: Negative for dizziness, weakness, numbness and headaches. Psychiatric/Behavioral: Negative for dysphoric mood.        Allergies   Allergen Reactions    Lyrica [Pregabalin]      Confusion, sleepiness       Past Medical History:   Diagnosis Date    Basal cell carcinoma of skin     Right Lower Eyelid    Cancer (HCC)     Cataract, bilateral     CKD (chronic kidney disease)     Family history of colon cancer     H/O Doppler ultrasound 10/31/2018    No evidence of AAA within the visualized portions of the abdominal aorta    H/O echocardiogram 11/14/2018    EF 55-60%    History of nuclear stress test 10/31/2018    Normal LV function    Hyperlipidemia     Hypertension     Macular hole, right eye     Obstructive sleep apnea (adult) (pediatric)     RLS (restless legs syndrome)     Type 2 diabetes mellitus with diabetic neuropathy (HCC)     Type 2 diabetes mellitus with hyperglycemia (HCC)     Unspecified dementia without behavioral disturbance ([facility]        Past Surgical History:   Procedure Laterality Date    COLONOSCOPY  2012    sig diverticula-Recomm repeat in 5 yrs    MALIGNANT SKIN LESION EXCISION  10/26/2017    BCC excision with direct reconstruction right lower eyelid       Family History   Problem Relation Age of Onset    Heart Attack Mother     Heart Attack Father        Social History     Tobacco Use    Smoking status: Former Smoker     Packs/day: 1.00     Years: 35.00     Pack years: 35.00     Quit date:      Years since quittin.2    Smokeless tobacco: Never Used   Substance Use Topics    Alcohol use: No    Drug use: No       Current Outpatient Medications   Medication Sig Dispense Refill    metoprolol succinate (TOPROL XL) 50 MG extended release tablet TAKE ONE (1) TABLET BY MOUTH ONCE DAILY 90 tablet 1    donepezil (ARICEPT) 10 MG tablet Take 1 tablet by mouth nightly 90 tablet 1    gabapentin (NEURONTIN) 300 MG capsule TAKE ONE (1) CAPSULE BY MOUTH ONCE DAILY 90 capsule 1    pravastatin (PRAVACHOL) 40 MG tablet TAKE ONE (1) TABLET BY MOUTH ONCE DAILY 90 tablet 1    losartan (COZAAR) 100 MG tablet TAKE 1 TABLET BY MOUTH ONCE DAILY 90 tablet 1    citalopram (CELEXA) 10 MG tablet TAKE ONE (1) TABLET BY MOUTH ONCE DAILY 90 tablet 2    pramipexole (MIRAPEX) 0.5 MG tablet TAKE 1 TABLET BY MOUTH AT BEDTIME 90 tablet 1    ULTRACARE PEN NEEDLES 31G X 5 MM MISC       insulin glargine (BASAGLAR KWIKPEN) 100 UNIT/ML injection pen Inject 25-50 Units into the skin nightly       glipiZIDE (GLUCOTROL) 5 MG tablet Take 10 mg by mouth daily       hydroCHLOROthiazide (MICROZIDE) 12.5 MG capsule Take 2 tabs QAM 30 capsule 0    Cholecalciferol (VITAMIN D3) 50 MCG (2000 UT) TABS Take 1 tablet by mouth daily 90 tablet 3    aspirin EC 81 MG EC tablet Take 1 tablet by mouth daily 90 tablet 3     No current facility-administered medications for this visit.         OBJECTIVE:    BP (!) 151/79   Pulse 56   Temp 96.9 °F (36.1 °C)   Resp 18   Wt 212 lb (96.2 kg)   SpO2 97%   BMI 33.20 kg/m²     Physical Exam  Vitals signs reviewed. Constitutional:       General: He is not in acute distress. Eyes:      Extraocular Movements: Extraocular movements intact. Conjunctiva/sclera: Conjunctivae normal.      Pupils: Pupils are equal, round, and reactive to light. Neck:      Musculoskeletal: Neck supple. Cardiovascular:      Rate and Rhythm: Normal rate and regular rhythm. Pulmonary:      Effort: Pulmonary effort is normal. No respiratory distress. Breath sounds: Normal breath sounds. Abdominal:      General: Bowel sounds are normal.      Palpations: Abdomen is soft. Tenderness: There is no abdominal tenderness. Musculoskeletal:      Right lower leg: No edema. Left lower leg: No edema. Neurological:      Mental Status: He is alert and oriented to person, place, and time. Cranial Nerves: No cranial nerve deficit. Psychiatric:         Mood and Affect: Mood normal.         ASSESSMENT:  1. Essential hypertension    2. Long term use of drug    3. Anxiety    4. Hyperlipidemia, unspecified hyperlipidemia type        PLAN:    Orders Placed This Encounter   Procedures    COMPREHENSIVE METABOLIC PANEL    Urinalysis with Microscopic    TSH WITH REFLEX TO FT4    Lipid Panel   Monitor Bp  Per orders  Continue medications. Avoid Nsaids. Will need appt with Nephro  Nurse recheck in 1 week  Pt has follow up with Dr. Pk Ag         Return if symptoms worsen or fail to improve.     Electronically Signed by Channing Elaine DO

## 2021-03-31 DIAGNOSIS — I10 ESSENTIAL HYPERTENSION: Primary | ICD-10-CM

## 2021-03-31 RX ORDER — ASPIRIN 81 MG/1
81 TABLET ORAL DAILY
Qty: 90 TABLET | Refills: 3 | Status: SHIPPED | OUTPATIENT
Start: 2021-03-31 | End: 2021-12-07

## 2021-03-31 RX ORDER — CHOLECALCIFEROL (VITAMIN D3) 125 MCG
1 CAPSULE ORAL DAILY
Qty: 90 TABLET | Refills: 3 | Status: SHIPPED | OUTPATIENT
Start: 2021-03-31 | End: 2021-12-07

## 2021-04-01 RX ORDER — HYDROCHLOROTHIAZIDE 12.5 MG/1
CAPSULE, GELATIN COATED ORAL
Qty: 30 CAPSULE | Refills: 0 | Status: SHIPPED | OUTPATIENT
Start: 2021-04-01 | End: 2021-04-26

## 2021-04-01 NOTE — TELEPHONE ENCOUNTER
Per PCP, pt may take 2 tabs QAM. Okay to send temporary Rx to [facility]. This will get pt to his appt with Dr Zamzam Downs, who will assess elevated BP and Rx accordingly. Pt informed and agrees with POC. Rx sent. No further action is needed, at this time.

## 2021-04-01 NOTE — TELEPHONE ENCOUNTER
Pt called to verify meds and sig, because he is changing from local pharmacy to [facility]- with pre packaged doses. Pt had OV 3/30/21, for elevated BP. Per note, pt's HCTZ 12.5mg increased to BID. Pt states PCP told him to take both tabs in AM, so he was not up going to restroom all night. Please clarify if pt is supposed to take both at once, or 1 BID. Also, he is concerned about running out of HCTZ and having to open other packs, to take 2 tabs daily. Can we send temporary Rx (2-4 weeks) to [facility], for the additional pill? Then, send the correct Rx to [facility], so that we have the updated Rx/Qty in the chart? Please advise.

## 2021-04-05 ENCOUNTER — TELEPHONE (OUTPATIENT)
Dept: INTERNAL MEDICINE CLINIC | Age: 81
End: 2021-04-05

## 2021-04-05 NOTE — TELEPHONE ENCOUNTER
Pt picked up temporary Rx, then thought he was supposed to take a total of 3 HCTZ tabs. Reminded pt, Rx was increased from 1, to 2 tablets. Additional Rx sent to local pharmacy, so pt would not run out of medication (prior to Mark Hurd appt-today), and would not have to open additional pill packs, for 2nd pill. Pt voiced understanding/thanks. He will call office, if any further concern. No further action is needed, at this time.

## 2021-04-05 NOTE — TELEPHONE ENCOUNTER
Spoke with pt about completing medicare AWV, pt states that he has a few other appointments this afternoon but will call back when he is home.

## 2021-04-15 ENCOUNTER — OFFICE VISIT (OUTPATIENT)
Dept: CARDIOLOGY CLINIC | Age: 81
End: 2021-04-15
Payer: MEDICARE

## 2021-04-15 VITALS
DIASTOLIC BLOOD PRESSURE: 82 MMHG | BODY MASS INDEX: 32.8 KG/M2 | HEIGHT: 67 IN | SYSTOLIC BLOOD PRESSURE: 162 MMHG | WEIGHT: 209 LBS

## 2021-04-15 DIAGNOSIS — G47.33 OSA (OBSTRUCTIVE SLEEP APNEA): ICD-10-CM

## 2021-04-15 DIAGNOSIS — R00.1 BRADYCARDIA: ICD-10-CM

## 2021-04-15 DIAGNOSIS — I51.89 DIASTOLIC DYSFUNCTION: ICD-10-CM

## 2021-04-15 DIAGNOSIS — Z79.4 TYPE 2 DIABETES MELLITUS WITHOUT COMPLICATION, WITH LONG-TERM CURRENT USE OF INSULIN (HCC): ICD-10-CM

## 2021-04-15 DIAGNOSIS — N18.30 STAGE 3 CHRONIC KIDNEY DISEASE, UNSPECIFIED WHETHER STAGE 3A OR 3B CKD (HCC): ICD-10-CM

## 2021-04-15 DIAGNOSIS — E11.42 DIABETIC POLYNEUROPATHY ASSOCIATED WITH TYPE 2 DIABETES MELLITUS (HCC): ICD-10-CM

## 2021-04-15 DIAGNOSIS — I10 ESSENTIAL HYPERTENSION: Primary | ICD-10-CM

## 2021-04-15 DIAGNOSIS — Z87.891 EX-SMOKER: ICD-10-CM

## 2021-04-15 DIAGNOSIS — E11.9 TYPE 2 DIABETES MELLITUS WITHOUT COMPLICATION, WITH LONG-TERM CURRENT USE OF INSULIN (HCC): ICD-10-CM

## 2021-04-15 PROBLEM — N18.4 CHRONIC KIDNEY DISEASE, STAGE IV (SEVERE) (HCC): Status: RESOLVED | Noted: 2020-10-06 | Resolved: 2021-04-15

## 2021-04-15 PROCEDURE — G8427 DOCREV CUR MEDS BY ELIG CLIN: HCPCS | Performed by: INTERNAL MEDICINE

## 2021-04-15 PROCEDURE — 93000 ELECTROCARDIOGRAM COMPLETE: CPT | Performed by: INTERNAL MEDICINE

## 2021-04-15 PROCEDURE — 99214 OFFICE O/P EST MOD 30 MIN: CPT | Performed by: INTERNAL MEDICINE

## 2021-04-15 PROCEDURE — 1123F ACP DISCUSS/DSCN MKR DOCD: CPT | Performed by: INTERNAL MEDICINE

## 2021-04-15 PROCEDURE — 1036F TOBACCO NON-USER: CPT | Performed by: INTERNAL MEDICINE

## 2021-04-15 PROCEDURE — G8417 CALC BMI ABV UP PARAM F/U: HCPCS | Performed by: INTERNAL MEDICINE

## 2021-04-15 PROCEDURE — 4040F PNEUMOC VAC/ADMIN/RCVD: CPT | Performed by: INTERNAL MEDICINE

## 2021-04-15 NOTE — ASSESSMENT & PLAN NOTE
Continue current medication. Patient is counseled to start monitoring blood pressure regularly and bring the results along with the blood pressure dial he has at home for validation. Goal is to keep the blood pressure below 140/90 most of the time. Weight management and salt restriction is counseled.

## 2021-04-15 NOTE — PROGRESS NOTES
CARDIOLOGY CONSULTATION    Chaparrita Dawkins  [birth year]    Dear Dr. eLxy Bernabe, DO    Thank you for asking me to participate in care of Chaparrita Dawkins    Chief Complaint   Patient presents with    Hypertension     Patient has no cardiac complaints at this time. Patient did bring in his medications and a list. Patient doesn't really exercise due to taking care of his wife. Patient might drink 1 cup of coffee a day.  Hyperlipidemia     History of present illness:  Chaparrita Dawkins is a 80 y.o. male is seeing me for the first time for hypertension and has diabetes obstructive sleep apnea and chronic renal insufficiency. His daughter accompanies him is concerned that his blood pressure has been creeping up. I have reviewed several encounters with PCP and nephrology and some of the time it is normal and some times it is elevated. Patient has stopped monitoring blood pressure at home. His medications are reviewed and he has been compliant with his medications. He is ex-smoker. He does not exercise much. He has a recumbent bike he uses for 20 minutes twice a week. He has lost weight recently. He is up-to-date with Covid vaccination. REVIEW OF SYSTEMS:   Constitutional:  Denies generalized weakness  Eyes:  Denies change in visual acuity  HENT:  Denies nasal congestion or sore throat  Respiratory:  Denies cough or shortness of breath  Cardiovascular: as in HPI  GI:  Denies abdominal pain, complains of nausea and vomiting  :  Denies dysuria  Musculoskeletal:  Denies back pain or joint pain  Integument:  Denies rash  Neurologic:  Denies headache, focal weakness or sensory changes  \"Remaining review of systems reviewed and negative.      Past medical history:  has a past medical history of Basal cell carcinoma of skin, Cancer ([facility] Cataract, bilateral, CKD (chronic kidney disease), Family history of colon cancer, H/O Doppler ultrasound, H/O echocardiogram, History of nuclear stress test, Hyperlipidemia, Hypertension, Macular hole, right eye, Obstructive sleep apnea (adult) (pediatric), RLS (restless legs syndrome), Type 2 diabetes mellitus with diabetic neuropathy ([facility] Type 2 diabetes mellitus with hyperglycemia ([facility] and Unspecified dementia without behavioral disturbance ([facility] Past surgical history:  has a past surgical history that includes Colonoscopy (2012) and malignant skin lesion excision (10/26/2017). Social History:   Social History     Tobacco Use    Smoking status: Former Smoker     Packs/day: 1.00     Years: 35.00     Pack years: 35.00     Quit date:      Years since quittin.3    Smokeless tobacco: Never Used   Substance Use Topics    Alcohol use: No     Family history: family history includes Heart Attack in his father and mother. Allergies   Allergen Reactions    Lyrica [Pregabalin]      Confusion, sleepiness     Prior to Admission medications    Medication Sig Start Date End Date Taking?  Authorizing Provider   hydroCHLOROthiazide (MICROZIDE) 12.5 MG capsule Take 2 tabs QAM 21  Yes Unique Ellis DO   Cholecalciferol (VITAMIN D3) 50 MCG ( UT) TABS Take 1 tablet by mouth daily 3/31/21  Yes Unique Ellis DO   aspirin EC 81 MG EC tablet Take 1 tablet by mouth daily 3/31/21  Yes Unique Ellis DO   metoprolol succinate (TOPROL XL) 50 MG extended release tablet TAKE ONE (1) TABLET BY MOUTH ONCE DAILY 3/2/21  Yes Gustabo Turner MD   donepezil (ARICEPT) 10 MG tablet Take 1 tablet by mouth nightly 21  Yes Unique Ellis DO   pravastatin (PRAVACHOL) 40 MG tablet TAKE ONE (1) TABLET BY MOUTH ONCE DAILY 21  Yes Unique Ellis DO   losartan (COZAAR) 100 MG tablet TAKE 1 TABLET BY MOUTH ONCE DAILY 12/10/20  Yes Unique Ellis DO   citalopram (CELEXA) 10 MG tablet TAKE ONE (1) TABLET BY MOUTH ONCE DAILY 12/10/20  Yes Delio Price MD   pramipexole (MIRAPEX) 0.5 MG tablet TAKE 1 TABLET BY MOUTH AT BEDTIME 20  Yes Melanie Moore DO   glipiZIDE (GLUCOTROL) 5 MG tablet Take 10 mg by mouth daily    Yes Historical Provider, MD   gabapentin (NEURONTIN) 300 MG capsule TAKE ONE (1) CAPSULE BY MOUTH ONCE DAILY 1/11/21 4/5/21  Hailee Province, DO   [facility] X 5 MM MISC  8/18/20   Historical Provider, MD   insulin glargine (BASAGLAR KWIKPEN) 100 UNIT/ML injection pen Inject 25-50 Units into the skin nightly     Historical Provider, MD       Physical Examination:    Vitals:    04/15/21 0921 04/15/21 0957   BP: (!) 148/82 (!) 162/82   Weight: 209 lb (94.8 kg)    Height: 5' 7\" (1.702 m)       Body mass index is 32.73 kg/m². Wt Readings from Last 3 Encounters:   04/15/21 209 lb (94.8 kg)   04/05/21 210 lb (95.3 kg)   03/30/21 212 lb (96.2 kg)     Constitutional: Moderately overweight pleasant male in no apparent distress  Eyes: He is wearing glasses  ENT: He is wearing a facemask otherwise unremarkable  NECK: No JVP or thyromegaly  Cardiovascular: Auscultation: Normal S1 and S2. No significant murmurs or gallops noted. Carotids are negative for bruits. Abdominal aorta is nonpalpable. No epigastric bruit noted. Peripheral pulses: Both posterior tibial and dorsalis pedis are 2+ equal in both feet  Respiratory:  Respiratory effort is normal.  Breath sounds are managed with bibasal fine crepitations up to one third posteriorly. No wheezing noted  Extremities:  No edema, clubbing,  Cyanosis, petechiae. SKIN: Warm and well perfused, no pallor or cyanosis  Abdomen:  No masses or tenderness. No organomegaly noted. Musculoskeletal: No obvious spinal deformities noted. Gait is normal.  Muscle strength is normal  Neurologic:  Oriented to time, place, and person and non-anxious. No focal neurological deficit noted. Psychiatric: Normal mood and effect.      LAB REVIEW:  CBC:   Lab Results   Component Value Date    WBC 7.9 04/06/2021    WBC 7.7 05/15/2018    HGB 12.9 04/06/2021    HCT 40.1 04/06/2021     04/06/2021     Lipids:   Lab Results   Component Value Date    CHOL 132 11/01/2018    TRIG 107 11/01/2018    HDL 37 11/01/2018    LDLCALC 74 11/01/2018     Renal:   Lab Results   Component Value Date    BUN 29 04/06/2021    CREATININE 1.8 04/06/2021     04/06/2021    K 4.4 04/06/2021     PT/INR: No results found for: INR  Lab Results   Component Value Date    LABA1C 7.3 (H) 11/01/2018       The ASCVD Risk score (Shey Rico., et al., 2013) failed to calculate for the following reasons: The 2013 ASCVD risk score is only valid for ages 36 to 78    EKG: Sinus bradycardia 53 bpm with low voltages in limb leads. Nuclear stress test November 2018 reported  Normal LV function. There is normal isotope uptake following exercise and at rest. There is no  evidence of exercise induced ischemia. This is a normal study. Left ventricular systolic function is normal with an ejection fraction of    Echocardiogram November 2018 reported   55-60%. Grade I diastolic dysfunction. Mild concentric left ventricular hypertrophy. Mild dilatation of the aortic root. The left atrium is mildly dilated. Mild aortic regurgitation is noted with a pressure half time of 659 msec. No other significant valvulopathy seen. Right ventricular systolic pressure of 35 mmHg consistent with mild   pulmonary hypertension. No evidence of pericardial effusion. Assessment / Recommendations:    Chronic kidney disease, stage III (moderate) ([facility])  Recent creatinine was 1.8. It used to be 2.0 in 2018. He is following up with nephrology closely. Diastolic dysfunction  Clinically not in congestive heart failure however has bibasilar fine crepitations probably related to atelectasis or emphysema. We will obtain a chest x-ray to evaluate it further. Essential hypertension  Continue current medication. Patient is counseled to start monitoring blood pressure regularly and bring the results along with the blood pressure dial he has at home for validation.   Goal is to keep the blood pressure below 140/90 most of the time. Weight management and salt restriction is counseled. Bradycardia  Patient is asymptomatic. We will continue beta-blocker therapy as he has diastolic LV dysfunction. Continue to monitor clinically. Continue current medications. Counseled to lose weight and restrict salt intake. Start monitoring BP and HR daily and write it down and bring the readings along with the BP monitor for office visit. Chest x-ray to evaluate pulmonary status. Multiple questions answered. Patient verbalizes understanding and asks relevant questions. Follow up in 3 months, sooner if needed. Fani Michael MD, 4/15/2021 10:03 AM     Please note this report has been produced using speech recognition software and may contain errors related to that system including errors in grammar, punctuation, and spelling, as well as words and phrases that may be inappropriate.  If there are any questions or concerns please feel free to contact the dictating provider for clarification

## 2021-04-20 RX ORDER — PRAMIPEXOLE DIHYDROCHLORIDE 0.5 MG/1
TABLET ORAL
Qty: 90 TABLET | Refills: 1 | Status: SHIPPED | OUTPATIENT
Start: 2021-04-20 | End: 2021-10-27

## 2021-04-20 RX ORDER — GABAPENTIN 300 MG/1
CAPSULE ORAL
Qty: 90 CAPSULE | Refills: 1 | Status: SHIPPED | OUTPATIENT
Start: 2021-04-20 | End: 2021-09-22

## 2021-04-24 DIAGNOSIS — I10 ESSENTIAL HYPERTENSION: ICD-10-CM

## 2021-04-26 RX ORDER — HYDROCHLOROTHIAZIDE 12.5 MG/1
CAPSULE, GELATIN COATED ORAL
Qty: 60 CAPSULE | Refills: 2 | Status: SHIPPED | OUTPATIENT
Start: 2021-04-26 | End: 2021-07-26

## 2021-06-02 ENCOUNTER — OFFICE VISIT (OUTPATIENT)
Dept: CARDIOLOGY CLINIC | Age: 81
End: 2021-06-02
Payer: MEDICARE

## 2021-06-02 ENCOUNTER — HOSPITAL ENCOUNTER (OUTPATIENT)
Age: 81
Discharge: HOME | End: 2021-06-02
Payer: MEDICARE

## 2021-06-02 ENCOUNTER — HOSPITAL ENCOUNTER (OUTPATIENT)
Dept: GENERAL RADIOLOGY | Age: 81
Discharge: HOME | End: 2021-06-02
Payer: MEDICARE

## 2021-06-02 VITALS
HEIGHT: 67 IN | HEART RATE: 62 BPM | SYSTOLIC BLOOD PRESSURE: 128 MMHG | BODY MASS INDEX: 32.65 KG/M2 | DIASTOLIC BLOOD PRESSURE: 72 MMHG | WEIGHT: 208 LBS

## 2021-06-02 DIAGNOSIS — Z86.73 HISTORY OF CVA (CEREBROVASCULAR ACCIDENT) WITHOUT RESIDUAL DEFICITS: ICD-10-CM

## 2021-06-02 DIAGNOSIS — N18.30 STAGE 3 CHRONIC KIDNEY DISEASE, UNSPECIFIED WHETHER STAGE 3A OR 3B CKD (HCC): Primary | ICD-10-CM

## 2021-06-02 DIAGNOSIS — G47.33 OSA (OBSTRUCTIVE SLEEP APNEA): ICD-10-CM

## 2021-06-02 DIAGNOSIS — E11.9 TYPE 2 DIABETES MELLITUS WITHOUT COMPLICATION, WITH LONG-TERM CURRENT USE OF INSULIN (HCC): ICD-10-CM

## 2021-06-02 DIAGNOSIS — Z87.891 EX-SMOKER: ICD-10-CM

## 2021-06-02 DIAGNOSIS — I10 ESSENTIAL HYPERTENSION: ICD-10-CM

## 2021-06-02 DIAGNOSIS — J84.10 PULMONARY FIBROSIS (HCC): ICD-10-CM

## 2021-06-02 DIAGNOSIS — Z79.4 TYPE 2 DIABETES MELLITUS WITHOUT COMPLICATION, WITH LONG-TERM CURRENT USE OF INSULIN (HCC): ICD-10-CM

## 2021-06-02 PROCEDURE — 1123F ACP DISCUSS/DSCN MKR DOCD: CPT | Performed by: INTERNAL MEDICINE

## 2021-06-02 PROCEDURE — 71046 X-RAY EXAM CHEST 2 VIEWS: CPT

## 2021-06-02 PROCEDURE — G8417 CALC BMI ABV UP PARAM F/U: HCPCS | Performed by: INTERNAL MEDICINE

## 2021-06-02 PROCEDURE — G8428 CUR MEDS NOT DOCUMENT: HCPCS | Performed by: INTERNAL MEDICINE

## 2021-06-02 PROCEDURE — 1036F TOBACCO NON-USER: CPT | Performed by: INTERNAL MEDICINE

## 2021-06-02 PROCEDURE — 4040F PNEUMOC VAC/ADMIN/RCVD: CPT | Performed by: INTERNAL MEDICINE

## 2021-06-02 PROCEDURE — 99214 OFFICE O/P EST MOD 30 MIN: CPT | Performed by: INTERNAL MEDICINE

## 2021-06-02 NOTE — ASSESSMENT & PLAN NOTE
Carotid US recommended by neurology for follow up. If this is negative for any significant carotid stenosis we will consider cardiac monitoring for possible cryptogenic stroke.

## 2021-06-02 NOTE — PROGRESS NOTES
Lars Max (:  1940) is a 80 y.o. male,     Chief Complaint   Patient presents with    Hypertension     Patient is on Toprol 50mg.  Hyperlipidemia     Patient is on on pravastatin 40 mg.    Other     Patient has no cardiac complaint at this time. Patient didnt bring medication but has pharmacy list at this appt. Patient does not exercise. Patient some times drinks 1 cup of coffee in the morning. Patient is here for follow up for on chest x-ray and hypertension. Apparently he has been monitoring blood pressure at home forgot to bring the readings. He reports his blood pressure is higher at home than here. Granddaughter reports that he had MRI of his brain that showed he had a stroke in the past.  Patient does not have any history of having a stroke and they have made an appointment with Dr. Ajit Adorno neurologist they wanted him to have a carotid ultrasound done. He reports no cardiac symptoms today. He has been fully vaccinated for COVID-19. He has been compliant with his medications. He did not bring his medications with him. Allergies   Allergen Reactions    Lyrica [Pregabalin]      Confusion, sleepiness     Prior to Admission medications    Medication Sig Start Date End Date Taking?  Authorizing Provider   hydroCHLOROthiazide (MICROZIDE) 12.5 MG capsule TAKE 2 CAPS BY MOUTH EVERY MORNING 21  Yes Charan Erickson, DO   pramipexole (MIRAPEX) 0.5 MG tablet TAKE 1 TABLET BY MOUTH AT BEDTIME 21  Yes Charan Erickson, DO   Cholecalciferol (VITAMIN D3) 50 MCG ( UT) TABS Take 1 tablet by mouth daily 3/31/21  Yes Charan Erickson, DO   aspirin EC 81 MG EC tablet Take 1 tablet by mouth daily 3/31/21  Yes Charan Erickson, DO   metoprolol succinate (TOPROL XL) 50 MG extended release tablet TAKE ONE (1) TABLET BY MOUTH ONCE DAILY 3/2/21  Yes Hiren Morrison MD   donepezil (ARICEPT) 10 MG tablet Take 1 tablet by mouth nightly 21  Yes Charan Erickson, DO   pravastatin (PRAVACHOL) 40 MG tablet TAKE ONE (1) TABLET BY MOUTH ONCE DAILY 1/11/21  Yes Shannan Cali DO   losartan (COZAAR) 100 MG tablet TAKE 1 TABLET BY MOUTH ONCE DAILY 12/10/20  Yes Shannan Cali DO   citalopram (CELEXA) 10 MG tablet TAKE ONE (1) TABLET BY MOUTH ONCE DAILY 12/10/20  Yes Delio Thompson MD   ULTRACARE PEN NEEDLES 31G X 5 MM MISC  8/18/20  Yes Historical Provider, MD   insulin glargine (BASAGLAR KWIKPEN) 100 UNIT/ML injection pen Inject 25-50 Units into the skin nightly    Yes Historical Provider, MD   glipiZIDE (GLUCOTROL) 5 MG tablet Take 10 mg by mouth daily    Yes Historical Provider, MD   gabapentin (NEURONTIN) 300 MG capsule TAKE ONE (1) CAPSULE BY MOUTH ONCE DAILY 4/20/21 5/20/21  Shannan Cali DO     Past Medical History:   Diagnosis Date    Basal cell carcinoma of skin     Right Lower Eyelid    Cancer ([facility]     Cataract, bilateral     CKD (chronic kidney disease)     Family history of colon cancer     H/O Doppler ultrasound 10/31/2018    No evidence of AAA within the visualized portions of the abdominal aorta    H/O echocardiogram 11/14/2018    EF 55-60%    History of nuclear stress test 10/31/2018    Normal LV function    Hyperlipidemia     Hypertension     Macular hole, right eye     Obstructive sleep apnea (adult) (pediatric)     RLS (restless legs syndrome)     Type 2 diabetes mellitus with diabetic neuropathy (HCC)     Type 2 diabetes mellitus with hyperglycemia (HCC)     Unspecified dementia without behavioral disturbance ([facility]       Vitals:    06/02/21 1552   BP: 128/72   Pulse: 62   Weight: 208 lb (94.3 kg)   Height: 5' 7\" (1.702 m)      Body mass index is 32.58 kg/m².   Wt Readings from Last 3 Encounters:   06/02/21 208 lb (94.3 kg)   04/15/21 209 lb (94.8 kg)   04/05/21 210 lb (95.3 kg)     Constitutional: Moderately overweight pleasant male in no apparent distress  Eyes: He is wearing glasses  ENT: He is wearing a facemask otherwise unremarkable  NECK: No JVP or thyromegaly  Cardiovascular: Auscultation: Normal S1 and S2. No significant murmurs or gallops noted. Carotids are negative for bruits. Abdominal aorta is nonpalpable. No epigastric bruit noted. Peripheral pulses: Both posterior tibial and dorsalis pedis are 2+ equal in both feet  Respiratory:  Respiratory effort is normal.  Breath sounds are managed with bibasal fine crepitations up to one third posteriorly. No wheezing noted  Extremities:  No edema, clubbing,  Cyanosis, petechiae. SKIN: Warm and well perfused, no pallor or cyanosis  Abdomen:  No masses or tenderness. No organomegaly noted. Musculoskeletal: No obvious spinal deformities noted. Gait is normal.  Muscle strength is normal  Neurologic:  Oriented to time, place, and person and non-anxious. No focal neurological deficit noted. Pertinent records reviewed and discussed with patient and results are as follow:  1. No evidence of a recent infarct or intracranial mass. 2.  Advanced generalized cerebral atrophy with presumed mild chronic small vessel ischemic disease. 3.  Small area of encephalomalacia also noted at the junction of the right temporal and right occipital lobes, likely sequelae of a remote insult. Chest x-ray this morning reported  No acute process.   Interstitial prominence throughout both lungs suggesting fibrosis      Lab Results   Component Value Date    WBC 7.9 04/06/2021    WBC 7.7 05/15/2018    HGB 12.9 04/06/2021    HCT 40.1 04/06/2021     04/06/2021     Lab Results   Component Value Date    CHOL 132 11/01/2018    TRIG 107 11/01/2018    HDL 37 11/01/2018    LDLCALC 74 11/01/2018     Lab Results   Component Value Date    BUN 29 04/06/2021    CREATININE 1.8 04/06/2021     04/06/2021    K 4.4 04/06/2021     No results found for: INR  Lab Results   Component Value Date    LABA1C 7.3 (H) 11/01/2018     ASSESSMENT/PLAN:    1. Stage 3 chronic kidney disease, unspecified whether stage 3a or 3b CKD  Assessment & Plan:  Patient has a follow-up with nephrology in a week. He has been advised to take all his blood pressure readings to have Dr. Yanira Anderson reviewed them for further recommendations. 2. Essential hypertension  Assessment & Plan:  Much improved today compared to last office visit. Continue Toprol 50 mg daily and losartan 100 mg daily. Continue monitoring blood pressure at home and bring those results to every office visit. 3. DENISE (obstructive sleep apnea)  -     ECHO Complete 2D W Doppler W Color; Future  4. Pulmonary fibrosis ([address]  Assessment & Plan:  Not very symptomatic. Will repeat echo to assess pulmonary pressures and compare with 2018 study. Orders:  -     ECHO Complete 2D W Doppler W Color; Future  5. Type 2 diabetes mellitus without complication, with long-term current use of insulin ([address]  Assessment & Plan:  Follows up with PCP. A recent hemoglobin A1c is not available. 6. History of CVA (cerebrovascular accident) without residual deficits  Assessment & Plan:  Carotid US recommended by neurology for follow up. If this is negative for any significant carotid stenosis we will consider cardiac monitoring for possible cryptogenic stroke. Orders:  -     VL DUP CAROTID BILATERAL; Future      Continue current cardiovascular medications which have been reviewed and discussed individually with you. Take all home BP readings to next office visit with nephrology. Follow-up in 6 months with EKG, sooner if needed. repeat Echo to assess RV pressures. An electronic signature was used to authenticate this note.     --Vannessa Sharma MD

## 2021-06-02 NOTE — ASSESSMENT & PLAN NOTE
Patient has a follow-up with nephrology in a week. He has been advised to take all his blood pressure readings to have Dr. Sherrell Sosa reviewed them for further recommendations.

## 2021-06-02 NOTE — ASSESSMENT & PLAN NOTE
Much improved today compared to last office visit. Continue Toprol 50 mg daily and losartan 100 mg daily. Continue monitoring blood pressure at home and bring those results to every office visit.

## 2021-06-22 ENCOUNTER — TELEPHONE (OUTPATIENT)
Dept: INTERNAL MEDICINE CLINIC | Age: 81
End: 2021-06-22

## 2021-06-22 ENCOUNTER — PROCEDURE VISIT (OUTPATIENT)
Dept: CARDIOLOGY CLINIC | Age: 81
End: 2021-06-22
Payer: MEDICARE

## 2021-06-22 DIAGNOSIS — J84.10 PULMONARY FIBROSIS (HCC): ICD-10-CM

## 2021-06-22 DIAGNOSIS — R00.1 BRADYCARDIA: ICD-10-CM

## 2021-06-22 DIAGNOSIS — Z86.73 HISTORY OF CVA (CEREBROVASCULAR ACCIDENT) WITHOUT RESIDUAL DEFICITS: ICD-10-CM

## 2021-06-22 DIAGNOSIS — G47.33 OSA (OBSTRUCTIVE SLEEP APNEA): ICD-10-CM

## 2021-06-22 LAB
LV EF: 58 %
LVEF MODALITY: NORMAL

## 2021-06-22 PROCEDURE — 93880 EXTRACRANIAL BILAT STUDY: CPT | Performed by: INTERNAL MEDICINE

## 2021-06-22 PROCEDURE — 93306 TTE W/DOPPLER COMPLETE: CPT | Performed by: INTERNAL MEDICINE

## 2021-06-22 NOTE — TELEPHONE ENCOUNTER
[facility] left VM on MA line, citing possible interaction between Celexa 10mg and Donepezil 10mg, stating may cause QT prolongation or arrhythmia. Please advise.

## 2021-06-23 RX ORDER — MEMANTINE HYDROCHLORIDE 5 MG/1
5 TABLET ORAL DAILY
Qty: 30 TABLET | Refills: 1 | Status: SHIPPED | OUTPATIENT
Start: 2021-06-23 | End: 2021-07-26

## 2021-06-23 NOTE — TELEPHONE ENCOUNTER
Spoke to pt. He will hold the Aricept tonUngalli. I will contact Pura Braun (daughter) also so inform. He may want to change the Celexa.  Also I can change the Aricept  To Namenda

## 2021-06-23 NOTE — TELEPHONE ENCOUNTER
Macrina calling from [employer], requesting resolution/POC for Aricept/Celexa, so she can send meds to pt.

## 2021-06-23 NOTE — TELEPHONE ENCOUNTER
Irina Heading returned call. She is agreeable to change to Namenda. Spoke with Trenton Kang at [facility] and informed PCP will sent new Rx for Namenda. Trenton Kang voiced understanding. Please Rx.

## 2021-06-24 ENCOUNTER — OFFICE VISIT (OUTPATIENT)
Dept: INTERNAL MEDICINE CLINIC | Age: 81
End: 2021-06-24
Payer: MEDICARE

## 2021-06-24 VITALS
OXYGEN SATURATION: 99 % | WEIGHT: 207.2 LBS | DIASTOLIC BLOOD PRESSURE: 60 MMHG | BODY MASS INDEX: 32.45 KG/M2 | SYSTOLIC BLOOD PRESSURE: 120 MMHG | HEART RATE: 72 BPM

## 2021-06-24 DIAGNOSIS — I10 ESSENTIAL HYPERTENSION: ICD-10-CM

## 2021-06-24 DIAGNOSIS — I65.23 BILATERAL CAROTID ARTERY STENOSIS: ICD-10-CM

## 2021-06-24 DIAGNOSIS — E11.42 DIABETIC POLYNEUROPATHY ASSOCIATED WITH TYPE 2 DIABETES MELLITUS (HCC): ICD-10-CM

## 2021-06-24 DIAGNOSIS — F03.91 DEMENTIA WITH BEHAVIORAL DISTURBANCE, UNSPECIFIED DEMENTIA TYPE: Primary | ICD-10-CM

## 2021-06-24 DIAGNOSIS — E78.5 HYPERLIPIDEMIA, UNSPECIFIED HYPERLIPIDEMIA TYPE: ICD-10-CM

## 2021-06-24 PROCEDURE — G8427 DOCREV CUR MEDS BY ELIG CLIN: HCPCS | Performed by: FAMILY MEDICINE

## 2021-06-24 PROCEDURE — G8417 CALC BMI ABV UP PARAM F/U: HCPCS | Performed by: FAMILY MEDICINE

## 2021-06-24 PROCEDURE — 99214 OFFICE O/P EST MOD 30 MIN: CPT | Performed by: FAMILY MEDICINE

## 2021-06-24 PROCEDURE — 1036F TOBACCO NON-USER: CPT | Performed by: FAMILY MEDICINE

## 2021-06-24 PROCEDURE — 4040F PNEUMOC VAC/ADMIN/RCVD: CPT | Performed by: FAMILY MEDICINE

## 2021-06-24 PROCEDURE — 1123F ACP DISCUSS/DSCN MKR DOCD: CPT | Performed by: FAMILY MEDICINE

## 2021-06-24 RX ORDER — LOSARTAN POTASSIUM 100 MG/1
TABLET ORAL
Qty: 90 TABLET | Refills: 1 | Status: SHIPPED | OUTPATIENT
Start: 2021-06-24 | End: 2021-12-22

## 2021-06-24 ASSESSMENT — ENCOUNTER SYMPTOMS
SHORTNESS OF BREATH: 0
NAUSEA: 0
CHEST TIGHTNESS: 0
BACK PAIN: 0
COUGH: 0
ABDOMINAL PAIN: 0

## 2021-06-24 NOTE — PROGRESS NOTES
without residual deficits 06/02/2021    Bradycardia 04/15/2021    DENISE (obstructive sleep apnea) 03/23/2020    Obesity (BMI 30-39.9) 03/23/2020    Hypersomnia 03/23/2020    Ex-smoker 03/23/2020    Type 2 diabetes mellitus without complication, with long-term current use of insulin ([facility] 01/24/2020    Essential hypertension 01/24/2020    Persistent proteinuria 01/24/2020    Chronic kidney disease, stage III (moderate) ([facility] 01/24/2020    Vascular dementia without behavioral disturbance ([facility] 17/06/4433    Diastolic dysfunction 40/33/8136    Diabetic polyneuropathy associated with type 2 diabetes mellitus ([facility] 09/08/2019       Review of Systems   Constitutional: Negative for diaphoresis and fever. Respiratory: Negative for cough, chest tightness and shortness of breath. Cardiovascular: Negative for chest pain and palpitations. Gastrointestinal: Negative for abdominal pain and nausea. Genitourinary: Negative for difficulty urinating. Musculoskeletal: Negative for back pain. Neurological: Negative for dizziness and headaches. Psychiatric/Behavioral: Negative for dysphoric mood. MRI MRA Brain w/o contrast 4/27/21  [IMPRESSION] 1. No evidence of a recent infarct or intracranial mass. 2.  Advanced generalized cerebral atrophy with presumed mild chronic small vessel ischemic disease. 3.  Small area of encephalomalacia also noted at the junction of the right temporal and right occipital lobes, likely sequelae of a remote insult. VL DUP Carotid Bilateral  Impressions Right Impression The Right Mid internal carotid artery exhibits 50-69% stenosis. The Right Mid internal carotid artery exhibits moderate calcific plaque . Normal vertebral flow. Calcification present at the bulb/ bifurcation. Left Impression The Left Proximal internal carotid artery exhibits 0-49% stenosis. The Left Proximal internal carotid artery exhibits mild calcific plaque . Normal vertebral flow.  Calcification present at the bulb/ bifurcation. Objective    Vitals:    06/24/21 1501   BP: 120/60   Pulse: 72   SpO2: 99%   Weight: 207 lb 3.2 oz (94 kg)         Physical Exam  Vitals reviewed. Constitutional:       General: He is not in acute distress. Eyes:      Conjunctiva/sclera: Conjunctivae normal.   Cardiovascular:      Rate and Rhythm: Normal rate and regular rhythm. Pulmonary:      Effort: Pulmonary effort is normal. No respiratory distress. Breath sounds: Normal breath sounds. Abdominal:      General: Bowel sounds are normal.      Palpations: Abdomen is soft. Tenderness: There is no abdominal tenderness. Musculoskeletal:      Cervical back: Neck supple. Right lower leg: No edema. Left lower leg: No edema. Neurological:      Mental Status: He is alert and oriented to person, place, and time. Cranial Nerves: No cranial nerve deficit. Psychiatric:         Mood and Affect: Mood normal.            An electronic signature was used to authenticate this note.     --Charan Erickson, DO

## 2021-06-25 ENCOUNTER — TELEPHONE (OUTPATIENT)
Dept: CARDIOLOGY CLINIC | Age: 81
End: 2021-06-25

## 2021-06-25 DIAGNOSIS — K76.89 LIVER CYST: Primary | ICD-10-CM

## 2021-06-25 LAB
CHOLESTEROL, TOTAL: 116 MG/DL
CHOLESTEROL/HDL RATIO: NORMAL
HDLC SERPL-MCNC: 36 MG/DL (ref 35–70)
LDL CHOLESTEROL CALCULATED: 62 MG/DL (ref 0–160)
NONHDLC SERPL-MCNC: NORMAL MG/DL
TRIGL SERPL-MCNC: 90 MG/DL
VLDLC SERPL CALC-MCNC: 18 MG/DL

## 2021-06-25 RX ORDER — PRAVASTATIN SODIUM 40 MG
TABLET ORAL
Qty: 90 TABLET | Refills: 1 | Status: SHIPPED | OUTPATIENT
Start: 2021-06-25 | End: 2021-12-22

## 2021-06-25 NOTE — TELEPHONE ENCOUNTER
Patient left voicemail that Dr. Nat Kilgore office called him with results and would not give him the results. Per patient he had to call DR. Moore to get them please see note from DR. Bud Jacobo.   Patient is awaiting a phone call

## 2021-07-02 DIAGNOSIS — E78.5 HYPERLIPIDEMIA, UNSPECIFIED HYPERLIPIDEMIA TYPE: ICD-10-CM

## 2021-07-07 ENCOUNTER — HOSPITAL ENCOUNTER (OUTPATIENT)
Dept: ULTRASOUND IMAGING | Age: 81
Discharge: HOME | End: 2021-07-07
Payer: MEDICARE

## 2021-07-07 DIAGNOSIS — K76.89 LIVER CYST: ICD-10-CM

## 2021-07-07 PROCEDURE — 76705 ECHO EXAM OF ABDOMEN: CPT

## 2021-07-19 ENCOUNTER — VIRTUAL VISIT (OUTPATIENT)
Dept: INTERNAL MEDICINE CLINIC | Age: 81
End: 2021-07-19
Payer: MEDICARE

## 2021-07-19 DIAGNOSIS — Z00.00 ROUTINE GENERAL MEDICAL EXAMINATION AT A HEALTH CARE FACILITY: Primary | ICD-10-CM

## 2021-07-19 PROCEDURE — 4040F PNEUMOC VAC/ADMIN/RCVD: CPT | Performed by: FAMILY MEDICINE

## 2021-07-19 PROCEDURE — 1123F ACP DISCUSS/DSCN MKR DOCD: CPT | Performed by: FAMILY MEDICINE

## 2021-07-19 PROCEDURE — G0439 PPPS, SUBSEQ VISIT: HCPCS | Performed by: FAMILY MEDICINE

## 2021-07-19 SDOH — ECONOMIC STABILITY: FOOD INSECURITY: WITHIN THE PAST 12 MONTHS, THE FOOD YOU BOUGHT JUST DIDN'T LAST AND YOU DIDN'T HAVE MONEY TO GET MORE.: NEVER TRUE

## 2021-07-19 SDOH — ECONOMIC STABILITY: FOOD INSECURITY: WITHIN THE PAST 12 MONTHS, YOU WORRIED THAT YOUR FOOD WOULD RUN OUT BEFORE YOU GOT MONEY TO BUY MORE.: NEVER TRUE

## 2021-07-19 ASSESSMENT — LIFESTYLE VARIABLES
HOW MANY STANDARD DRINKS CONTAINING ALCOHOL DO YOU HAVE ON A TYPICAL DAY: 0
HOW OFTEN DURING THE LAST YEAR HAVE YOU NEEDED AN ALCOHOLIC DRINK FIRST THING IN THE MORNING TO GET YOURSELF GOING AFTER A NIGHT OF HEAVY DRINKING: 0
HOW OFTEN DURING THE LAST YEAR HAVE YOU BEEN UNABLE TO REMEMBER WHAT HAPPENED THE NIGHT BEFORE BECAUSE YOU HAD BEEN DRINKING: 0
AUDIT TOTAL SCORE: 2
HOW OFTEN DO YOU HAVE SIX OR MORE DRINKS ON ONE OCCASION: 0
HAS A RELATIVE, FRIEND, DOCTOR, OR ANOTHER HEALTH PROFESSIONAL EXPRESSED CONCERN ABOUT YOUR DRINKING OR SUGGESTED YOU CUT DOWN: 0
HOW OFTEN DURING THE LAST YEAR HAVE YOU HAD A FEELING OF GUILT OR REMORSE AFTER DRINKING: 0
HOW OFTEN DO YOU HAVE A DRINK CONTAINING ALCOHOL: 2
HOW OFTEN DURING THE LAST YEAR HAVE YOU FAILED TO DO WHAT WAS NORMALLY EXPECTED FROM YOU BECAUSE OF DRINKING: 0
HAVE YOU OR SOMEONE ELSE BEEN INJURED AS A RESULT OF YOUR DRINKING: 0
AUDIT-C TOTAL SCORE: 2
HOW OFTEN DURING THE LAST YEAR HAVE YOU FOUND THAT YOU WERE NOT ABLE TO STOP DRINKING ONCE YOU HAD STARTED: 0

## 2021-07-19 ASSESSMENT — SOCIAL DETERMINANTS OF HEALTH (SDOH): HOW HARD IS IT FOR YOU TO PAY FOR THE VERY BASICS LIKE FOOD, HOUSING, MEDICAL CARE, AND HEATING?: NOT HARD AT ALL

## 2021-07-19 ASSESSMENT — PATIENT HEALTH QUESTIONNAIRE - PHQ9
2. FEELING DOWN, DEPRESSED OR HOPELESS: 0
SUM OF ALL RESPONSES TO PHQ QUESTIONS 1-9: 0
1. LITTLE INTEREST OR PLEASURE IN DOING THINGS: 0
SUM OF ALL RESPONSES TO PHQ QUESTIONS 1-9: 0
SUM OF ALL RESPONSES TO PHQ QUESTIONS 1-9: 0
SUM OF ALL RESPONSES TO PHQ9 QUESTIONS 1 & 2: 0

## 2021-07-19 NOTE — PROGRESS NOTES
Medicare Annual Wellness Visit  Name: Aminah Lee Date: 2021   MRN: [id number] Sex: Male   Age: 80 y.o. Ethnicity: Non-/Non    : 1940 Race: Ronaldo Muhammad is here for Medicare AWV    Screenings for behavioral, psychosocial and functional/safety risks, and cognitive dysfunction are all negative except as indicated below. These results, as well as other patient data from the [address] form, are documented in Flowsheets linked to this Encounter. Allergies   Allergen Reactions    Lyrica [Pregabalin]      Confusion, sleepiness       Prior to Visit Medications    Medication Sig Taking?  Authorizing Provider   pravastatin (PRAVACHOL) 40 MG tablet TAKE 1 TABLET BY MOUTH EVERY DAY Yes Melanie Moore, DO   losartan (COZAAR) 100 MG tablet TAKE 1 TABLET BY MOUTH ONCE DAILY for high blood pressure Yes Stacey Mcbride DO   memantine (NAMENDA) 5 MG tablet Take 1 tablet by mouth daily Yes Stacey Mcbride DO   hydroCHLOROthiazide (MICROZIDE) 12.5 MG capsule TAKE 2 CAPS BY MOUTH EVERY MORNING Yes Stacey Mcbride DO   pramipexole (MIRAPEX) 0.5 MG tablet TAKE 1 TABLET BY MOUTH AT BEDTIME Yes Stacey Mcbride DO   Cholecalciferol (VITAMIN D3) 50 MCG ( UT) TABS Take 1 tablet by mouth daily Yes Stacey Mcbride DO   aspirin EC 81 MG EC tablet Take 1 tablet by mouth daily Yes Stacey Mcbride DO   metoprolol succinate (TOPROL XL) 50 MG extended release tablet TAKE ONE (1) TABLET BY MOUTH ONCE DAILY Yes Virginie George MD   citalopram (CELEXA) 10 MG tablet TAKE ONE (1) TABLET BY MOUTH ONCE DAILY Yes Delio Emery MD   ULTRACARE PEN NEEDLES 31G X 5 MM MISC  Yes Historical Provider, MD   insulin glargine (BASAGLAR KWIKPEN) 100 UNIT/ML injection pen Inject 25-50 Units into the skin nightly  Yes Historical Provider, MD   glipiZIDE (GLUCOTROL) 5 MG tablet Take 5 mg by mouth daily  Yes Historical Provider, MD   gabapentin (NEURONTIN) 300 MG capsule TAKE ONE (1) CAPSULE BY MOUTH ONCE DAILY Jessica Burch DO       Past Medical History:   Diagnosis Date    Basal cell carcinoma of skin     Right Lower Eyelid    Cancer ([facility]     Cataract, bilateral     CKD (chronic kidney disease)     Family history of colon cancer     H/O Doppler ultrasound 10/31/2018    No evidence of AAA within the visualized portions of the abdominal aorta    H/O echocardiogram 11/14/2018    EF 55-60%    History of nuclear stress test 10/31/2018    Normal LV function    Hyperlipidemia     Hypertension     Macular hole, right eye     Obstructive sleep apnea (adult) (pediatric)     RLS (restless legs syndrome)     Type 2 diabetes mellitus with diabetic neuropathy (HCC)     Type 2 diabetes mellitus with hyperglycemia (HCC)     Unspecified dementia without behavioral disturbance [facility])        Past Surgical History:   Procedure Laterality Date    COLONOSCOPY  11/26/2012    sig diverticula-Recomm repeat in 5 yrs    MALIGNANT SKIN LESION EXCISION  10/26/2017    BCC excision with direct reconstruction right lower eyelid       Family History   Problem Relation Age of Onset    Stroke Mother     Kidney Disease Mother     Cancer Father         skin cancer    No Known Problems Sister     No Known Problems Brother        CareTeam (Including outside providers/suppliers regularly involved in providing care):   Patient Care Team:  Jessica Burch DO as PCP - General (Family Medicine)  Jessica Burch DO as PCP - [facility] Empaneled Provider  Tristne Oglesby MD (Ophthalmology)  My Rios MD as Consulting Physician (Cardiology)  Becca Velez MD (Orthopedic Surgery: Hand Surgery)  Courtney Malagon (Endocrinology)  Casandra Rizvi MD as Consulting Physician (Nephrology)  Kobe Ingram DPM as Consulting Physician (Podiatry)    Wt Readings from Last 3 Encounters:   06/24/21 207 lb 3.2 oz (94 kg)   06/07/21 201 lb 6.4 oz (91.4 kg)   06/02/21 208 lb (94.3 kg)     There were no vitals filed for this visit.   There is no height or weight on file to calculate BMI. Based upon direct observation of the patient, evaluation of cognition reveals remote memory intact, recent memory impaired. Patient's complete Health Risk Assessment and screening values have been reviewed and are found in Flowsheets. The following problems were reviewed today and where indicated follow up appointments were made and/or referrals ordered. Positive Risk Factor Screenings with Interventions:      Cognitive: Words recalled: 2 Words Recalled  Total Score Interpretation: Positive Mini-Cog  Did the patient refuse to take the cognition test?: No  Cognitive Impairment Interventions:  · Patient has diagnosis of dementia       General Health and ACP:  General  In general, how would you say your health is?: Good  In the past 7 days, have you experienced any of the following?  New or Increased Pain, New or Increased Fatigue, Loneliness, Social Isolation, Stress or Anger?: None of These  Do you get the social and emotional support that you need?: Yes  Do you have a Living Will?: Yes  Advance Directives     Power of [address] [place] [address] Will ACP-Advance Directive ACP-Power of     Not on File Not on File Not on File Not on File      General Health Risk Interventions:  · No Living Will: ACP documents already completed- patient asked to provide copy to the office    Health Habits/Nutrition:  Health Habits/Nutrition  Do you exercise for at least 20 minutes 2-3 times per week?: (!) No  Have you lost any weight without trying in the past 3 months?: No  Do you eat only one meal per day?: No  Have you seen the dentist within the past year?: Yes     Health Habits/Nutrition Interventions:  · Inadequate physical activity:  patient is not ready to increase his/her physical activity level at this time       Personalized Preventive Plan   Current Health Maintenance Status  Immunization History   Administered Date(s) Administered    COVID-19, Moderna, PF, 100mcg/0.5mL 02/01/2021, 02/21/2021    Influenza Virus Vaccine 10/23/2012, 10/23/2017, 10/01/2020    Influenza, High Dose (Fluzone 65 yrs and older) 09/27/2014    Influenza, Quadv, adjuvanted, 65 yrs +, IM, PF (Fluad) 09/17/2020    Pneumococcal Polysaccharide ([id number]) 10/19/2009, 09/17/2020    Pneumococcal Vaccine 11/20/2003    Tdap (Boostrix, Adacel) 11/13/2014    Tetanus 10/01/2005        Health Maintenance   Topic Date Due    Diabetic foot exam  Never done    Shingles Vaccine (1 of 2) Never done   [employer] Visit (AWV)  Never done    A1C test (Diabetic or Prediabetic)  10/23/2020    Flu vaccine (1) 09/01/2021    Pneumococcal 65+ yrs at Risk Vaccine (2 of 2 - PCV13) 09/17/2021    Diabetic retinal exam  03/21/2022    Potassium monitoring  04/06/2022    Creatinine monitoring  04/06/2022    Lipid screen  06/25/2022    DTaP/Tdap/Td vaccine (2 - Td or Tdap) 11/13/2024    COVID-19 Vaccine  Completed    Hepatitis A vaccine  Aged Out    Hib vaccine  Aged Out    Meningococcal (ACWY) vaccine  Aged Out     Recommendations for Lob Due: see orders and patient instructions/AVS. Discussed need for shingles vaccination. Patient states he has his diabetic foot exam at Dr. Phuong Delgado requested report for scanning. Unable to obtain 3 vital signs due to patient not having equipment to take blood pressure/temperature. Recommended screening schedule for the next 5-10 years is provided to the patient in written form: see Patient Instructions/AVS.    Ligia VARGAS LPN, 4/01/[birth year]178, performed the documented evaluation under the direct supervision of the attending physician. Da Mcpherson, was evaluated through a synchronous (real-time) audio-video encounter. The patient (or guardian if applicable) is aware that this is a billable service. Verbal consent to proceed has been obtained within the past 12 months.  The visit was conducted pursuant to the emergency declaration under the [address] [place] [address], [address] [place] [address] authority and the [employer] Act. Patient identification was verified, and a caregiver was present when appropriate. The patient was located in the state of [place], where the provider was credentialed to provide care. Total time spent for this encounter: Not billed by time    --Martinez Carnes LPN on 2/08/7334 at 77:85 AM    An electronic signature was used to authenticate this note.

## 2021-07-19 NOTE — PATIENT INSTRUCTIONS
Personalized Preventive Plan for Tricia Mendoza - 7/19/2021  Medicare offers a range of preventive health benefits. Some of the tests and screenings are paid in full while other may be subject to a deductible, co-insurance, and/or copay. Some of these benefits include a comprehensive review of your medical history including lifestyle, illnesses that may run in your family, and various assessments and screenings as appropriate. After reviewing your medical record and screening and assessments performed today your provider may have ordered immunizations, labs, imaging, and/or referrals for you. A list of these orders (if applicable) as well as your Preventive Care list are included within your After Visit Summary for your review. Other Preventive Recommendations:    · A preventive eye exam performed by an eye specialist is recommended every 1-2 years to screen for glaucoma; cataracts, macular degeneration, and other eye disorders. · A preventive dental visit is recommended every 6 months. · Try to get at least 150 minutes of exercise per week or 10,000 steps per day on a pedometer . · Order or download the FREE \"Exercise & Physical Activity: Your Everyday Guide\" from [employer]. Call 2-860.685.9681 or search [employer] online. · You need 0323-7752 mg of calcium and 8712-2721 IU of vitamin D per day. It is possible to meet your calcium requirement with diet alone, but a vitamin D supplement is usually necessary to meet this goal.  · When exposed to the sun, use a sunscreen that protects against both UVA and UVB radiation with an SPF of 30 or greater. Reapply every 2 to 3 hours or after sweating, drying off with a towel, or swimming. · Always wear a seat belt when traveling in a car. Always wear a helmet when riding a bicycle or motorcycle.

## 2021-09-16 RX ORDER — METOPROLOL SUCCINATE 50 MG/1
TABLET, EXTENDED RELEASE ORAL
Qty: 90 TABLET | Refills: 1 | Status: SHIPPED | OUTPATIENT
Start: 2021-09-16 | End: 2022-02-21

## 2021-09-22 RX ORDER — GABAPENTIN 300 MG/1
CAPSULE ORAL
Qty: 30 CAPSULE | Refills: 2 | Status: SHIPPED | OUTPATIENT
Start: 2021-09-22 | End: 2021-12-22

## 2021-12-14 ENCOUNTER — OFFICE VISIT (OUTPATIENT)
Dept: INTERNAL MEDICINE CLINIC | Age: 81
End: 2021-12-14
Payer: MEDICARE

## 2021-12-14 VITALS
HEIGHT: 67 IN | TEMPERATURE: 97.6 F | DIASTOLIC BLOOD PRESSURE: 60 MMHG | BODY MASS INDEX: 32.52 KG/M2 | OXYGEN SATURATION: 99 % | SYSTOLIC BLOOD PRESSURE: 126 MMHG | HEART RATE: 74 BPM | WEIGHT: 207.2 LBS

## 2021-12-14 DIAGNOSIS — I10 ESSENTIAL HYPERTENSION: Primary | ICD-10-CM

## 2021-12-14 DIAGNOSIS — E78.5 HYPERLIPIDEMIA, UNSPECIFIED HYPERLIPIDEMIA TYPE: ICD-10-CM

## 2021-12-14 DIAGNOSIS — L98.9 LESION OF SKIN OF SCALP: ICD-10-CM

## 2021-12-14 DIAGNOSIS — F03.91 DEMENTIA WITH BEHAVIORAL DISTURBANCE, UNSPECIFIED DEMENTIA TYPE: ICD-10-CM

## 2021-12-14 DIAGNOSIS — M70.21 OLECRANON BURSITIS OF RIGHT ELBOW: ICD-10-CM

## 2021-12-14 DIAGNOSIS — E11.42 DIABETIC POLYNEUROPATHY ASSOCIATED WITH TYPE 2 DIABETES MELLITUS (HCC): ICD-10-CM

## 2021-12-14 PROCEDURE — 1123F ACP DISCUSS/DSCN MKR DOCD: CPT | Performed by: FAMILY MEDICINE

## 2021-12-14 PROCEDURE — G8417 CALC BMI ABV UP PARAM F/U: HCPCS | Performed by: FAMILY MEDICINE

## 2021-12-14 PROCEDURE — 1036F TOBACCO NON-USER: CPT | Performed by: FAMILY MEDICINE

## 2021-12-14 PROCEDURE — G8484 FLU IMMUNIZE NO ADMIN: HCPCS | Performed by: FAMILY MEDICINE

## 2021-12-14 PROCEDURE — 4040F PNEUMOC VAC/ADMIN/RCVD: CPT | Performed by: FAMILY MEDICINE

## 2021-12-14 PROCEDURE — G8427 DOCREV CUR MEDS BY ELIG CLIN: HCPCS | Performed by: FAMILY MEDICINE

## 2021-12-14 PROCEDURE — 99214 OFFICE O/P EST MOD 30 MIN: CPT | Performed by: FAMILY MEDICINE

## 2021-12-14 ASSESSMENT — ENCOUNTER SYMPTOMS
BACK PAIN: 0
ABDOMINAL PAIN: 0
NAUSEA: 0
COUGH: 0
SHORTNESS OF BREATH: 0

## 2021-12-14 NOTE — PROGRESS NOTES
Hong Segal (:  1940) is a 80 y.o. male,Established patient, here for evaluation of the following chief complaint(s):  6 Month Follow-Up, Hypertension, and Other (pt. would like to have Dr. Betina Aaron look at a growth on his head)         ASSESSMENT/PLAN:  1. Essential hypertension, chronic  2. Hyperlipidemia, unspecified hyperlipidemia type, chronic  3. Dementia with behavioral disturbance, unspecified dementia type [facility])  -     Yanique Enciso MD, Neurology, [facility]  4. Diabetic polyneuropathy associated with type 2 diabetes mellitus (HCC)  -     Yanique Enciso MD, Neurology, [facility]  5. Lesion of skin of scalp  -     External Referral To Dermatology  6. Olecranon bursitis of right elbow  Avoid resting on elbow  Pt to monitor. Persist RTO or call  Continue medications  Form completed to pt to move to assisted living  Keep f/u with specialists  On this date 2021 I have spent 30 minutes reviewing previous notes, test results and face to face with the patient discussing the diagnosis and importance of compliance with the treatment plan as well as documenting on the day of the visit. Return for Follow up in  5 1/2 months  for  HTN.          Subjective   SUBJECTIVE/OBJECTIVE:  HPI: This 79 yo M here with wife and daughter for the following   Patient Active Problem List    Diagnosis Date Noted    Pulmonary fibrosis ([address] 2021    History of CVA (cerebrovascular accident) without residual deficits 2021    Bradycardia 04/15/2021    DENISE (obstructive sleep apnea) 2020    Obesity (BMI 30-39.9) 2020    Hypersomnia 2020    Ex-smoker 2020    Type 2 diabetes mellitus without complication, with long-term current use of insulin ([address] 2020    Essential hypertension 2020    Persistent proteinuria 2020    Chronic kidney disease, stage III (moderate) ([address] 2020    Vascular dementia without behavioral disturbance ([address] 82/18/6651    Diastolic dysfunction 44/11/4947    Diabetic polyneuropathy associated with type 2 diabetes mellitus ([facility] 09/08/2019     -He c/o of a scaly lesion on his scalp  -He has a lump on his R elbow. No redness or pain. No known injury  -Diabetic neuropathy. He feels this can make him feel unsteady with walking. He would like to see the same neurologist as his wife. (Dr. Juanita England). On Gabapentin  DM II- He follows with endocrinology. He states he is on Glipizide and Tradjenta and he is not using insulin. HTN and HLD-stable  Dementia on Namenda    Review of Systems   Constitutional: Negative for diaphoresis and fever. Respiratory: Negative for cough and shortness of breath. Cardiovascular: Negative for chest pain and palpitations. Gastrointestinal: Negative for abdominal pain and nausea. Genitourinary: Negative for difficulty urinating. Musculoskeletal: Negative for back pain. Neurological: Negative for dizziness and headaches. Psychiatric/Behavioral: Negative for dysphoric mood.        Allergies   Allergen Reactions    Lyrica [Pregabalin]      Confusion, sleepiness     Current Outpatient Medications   Medication Sig Dispense Refill    linagliptin (TRADJENTA) 5 MG tablet Take 5 mg by mouth daily      pramipexole (MIRAPEX) 0.5 MG tablet TAKE 1 TABLET BY MOUTH EVERYDAY AT BEDTIME 30 tablet 5    gabapentin (NEURONTIN) 300 MG capsule TAKE ONE (1) CAPSULE BY MOUTH ONCE DAILY 30 capsule 2    metoprolol succinate (TOPROL XL) 50 MG extended release tablet TAKE ONE (1) TABLET BY MOUTH ONCE DAILY 90 tablet 1    hydroCHLOROthiazide (MICROZIDE) 12.5 MG capsule TAKE 2 CAPSULES BY MOUTH EVERY MORNING 60 capsule 5    memantine (NAMENDA) 5 MG tablet TAKE 1 TABLET BY MOUTH EVERY DAY 30 tablet 5    pravastatin (PRAVACHOL) 40 MG tablet TAKE 1 TABLET BY MOUTH EVERY DAY 90 tablet 1    losartan (COZAAR) 100 MG tablet TAKE 1 TABLET BY MOUTH ONCE DAILY for high blood pressure 90 tablet 1    glipiZIDE (GLUCOTROL) 5 MG tablet Take 10 mg by mouth daily        No current facility-administered medications for this visit. Vitals:    12/14/21 1424   BP: 126/60   Site: Right Upper Arm   Position: Sitting   Cuff Size: Large Adult   Pulse: 74   Temp: 97.6 °F (36.4 °C)   SpO2: 99%   Weight: 207 lb 3.2 oz (94 kg)   Height: 5' 7\" (1.702 m)     Objective   Physical Exam  Vitals reviewed. Constitutional:       General: He is not in acute distress. Eyes:      Extraocular Movements: Extraocular movements intact. Conjunctiva/sclera: Conjunctivae normal.   Cardiovascular:      Rate and Rhythm: Normal rate and regular rhythm. Pulmonary:      Effort: Pulmonary effort is normal. No respiratory distress. Breath sounds: Normal breath sounds. Abdominal:      General: Bowel sounds are normal.      Palpations: Abdomen is soft. Tenderness: There is no abdominal tenderness. Musculoskeletal:      Cervical back: Neck supple. Right lower leg: No edema. Left lower leg: No edema. Comments: Olecranon bursa R   Neurological:      Mental Status: He is alert and oriented to person, place, and time. Psychiatric:         Mood and Affect: Mood normal.                An electronic signature was used to authenticate this note.     --Subha Dowd DO

## 2021-12-21 ENCOUNTER — TELEPHONE (OUTPATIENT)
Dept: INTERNAL MEDICINE CLINIC | Age: 81
End: 2021-12-21

## 2021-12-22 RX ORDER — LOSARTAN POTASSIUM 100 MG/1
TABLET ORAL
Qty: 30 TABLET | Refills: 5 | Status: SHIPPED | OUTPATIENT
Start: 2021-12-22

## 2021-12-22 RX ORDER — GABAPENTIN 300 MG/1
CAPSULE ORAL
Qty: 30 CAPSULE | Refills: 5 | Status: SHIPPED | OUTPATIENT
Start: 2021-12-22 | End: 2022-06-07

## 2021-12-22 RX ORDER — PRAVASTATIN SODIUM 40 MG
TABLET ORAL
Qty: 30 TABLET | Refills: 5 | Status: SHIPPED | OUTPATIENT
Start: 2021-12-22

## 2021-12-29 ENCOUNTER — OFFICE VISIT (OUTPATIENT)
Dept: CARDIOLOGY CLINIC | Age: 81
End: 2021-12-29
Payer: MEDICARE

## 2021-12-29 VITALS
BODY MASS INDEX: 32.25 KG/M2 | HEIGHT: 68 IN | HEART RATE: 82 BPM | DIASTOLIC BLOOD PRESSURE: 76 MMHG | WEIGHT: 212.8 LBS | SYSTOLIC BLOOD PRESSURE: 120 MMHG

## 2021-12-29 DIAGNOSIS — J84.10 PULMONARY FIBROSIS (HCC): ICD-10-CM

## 2021-12-29 DIAGNOSIS — I10 ESSENTIAL HYPERTENSION: Primary | ICD-10-CM

## 2021-12-29 DIAGNOSIS — E11.9 TYPE 2 DIABETES MELLITUS WITHOUT COMPLICATION, WITH LONG-TERM CURRENT USE OF INSULIN (HCC): ICD-10-CM

## 2021-12-29 DIAGNOSIS — Z79.4 TYPE 2 DIABETES MELLITUS WITHOUT COMPLICATION, WITH LONG-TERM CURRENT USE OF INSULIN (HCC): ICD-10-CM

## 2021-12-29 DIAGNOSIS — G47.33 OSA (OBSTRUCTIVE SLEEP APNEA): ICD-10-CM

## 2021-12-29 DIAGNOSIS — N18.30 STAGE 3 CHRONIC KIDNEY DISEASE, UNSPECIFIED WHETHER STAGE 3A OR 3B CKD (HCC): ICD-10-CM

## 2021-12-29 PROCEDURE — 4040F PNEUMOC VAC/ADMIN/RCVD: CPT | Performed by: INTERNAL MEDICINE

## 2021-12-29 PROCEDURE — 1036F TOBACCO NON-USER: CPT | Performed by: INTERNAL MEDICINE

## 2021-12-29 PROCEDURE — 1123F ACP DISCUSS/DSCN MKR DOCD: CPT | Performed by: INTERNAL MEDICINE

## 2021-12-29 PROCEDURE — G8417 CALC BMI ABV UP PARAM F/U: HCPCS | Performed by: INTERNAL MEDICINE

## 2021-12-29 PROCEDURE — G8428 CUR MEDS NOT DOCUMENT: HCPCS | Performed by: INTERNAL MEDICINE

## 2021-12-29 PROCEDURE — G8484 FLU IMMUNIZE NO ADMIN: HCPCS | Performed by: INTERNAL MEDICINE

## 2021-12-29 PROCEDURE — 99214 OFFICE O/P EST MOD 30 MIN: CPT | Performed by: INTERNAL MEDICINE

## 2021-12-29 RX ORDER — GLIPIZIDE 10 MG/1
10 TABLET, FILM COATED, EXTENDED RELEASE ORAL DAILY
COMMUNITY

## 2021-12-29 NOTE — PATIENT INSTRUCTIONS
Continue current cardiovascular medications which have been reviewed and discussed individually with you. Counseled for calorie counting, reduction in serving size and exercise and lifestyle modification for weight loss. Follow-up in 12 months with EKG, sooner if needed. Please be informed that if you contact our office outside of normal business hours the physician on call cannot help with any scheduling or rescheduling issues, procedure instruction questions or any type of medication issue. We advise you for any urgent/emergency that you go to the nearest emergency room! PLEASE CALL OUR OFFICE DURING NORMAL BUSINESS HOURS    Monday - Friday   8 am to 5 pm    [place]: [address]: [phone]    [place]:  [phone]  **It is YOUR responsibilty to bring medication bottles and/or updated medication list to [address] [place] [address]. This will allow us to better serve you and all your healthcare needs**  [facility] Locations - No appointment necessary. Sites open Monday to Friday. Call your preferred location for test preparation, business hours and other information you need. [employer] accepts [employer]. [facility] [address] [place], [address] [facility]  Phone: [phone] [facility] [address]  [address].   [facility], [address]  Phone: [phone]

## 2021-12-29 NOTE — PROGRESS NOTES
oRsy Damon (:  1940) is a 80 y.o. male,     Chief Complaint   Patient presents with    6 Month Follow-Up     Pt denies any new cardiac sx, no surgeries or procedures scheduled that he is aware of and no refills needed at this time, Pt states he does not get much exercise    Hypertension     Patient is here for follow up for hypertension and bradycardia and has obstructive sleep apnea on chronic kidney disease and diabetes. He has dyspnea on exertion which is stable. Denies any cardiac symptoms today. Follows up with nephrology regularly for chronic kidney disease. Is fully vaccinated against COVID-19 and has been very compliant with his medications. He does not exercise on a regular basis. Denies any orthopnea or paroxysmal nocturnal dyspnea or pedal edema. All medications are reviewed and discussed with him. Records are reviewed and reconciled and we discussed the echo report from last year and chest x-ray report from last year done after our last visit 6 months ago and questions are addressed. Allergies   Allergen Reactions    Lyrica [Pregabalin]      Confusion, sleepiness     Prior to Admission medications    Medication Sig Start Date End Date Taking?  Authorizing Provider   glipiZIDE (GLUCOTROL XL) 10 MG extended release tablet Take 10 mg by mouth daily   Yes Historical Provider, MD   pravastatin (PRAVACHOL) 40 MG tablet TAKE 1 TABLET BY MOUTH EVERY DAY 21  Yes Paul Chun DO   gabapentin (NEURONTIN) 300 MG capsule TAKE ONE (1) CAPSULE BY MOUTH ONCE DAILY 21 Yes Melanie Moore, DO   losartan (COZAAR) 100 MG tablet TAKE 1 TABLET BY MOUTH ONCE DAILY FOR HIGH BLOOD PRESSURE 21  Yes Paul Chun DO   linagliptin (TRADJENTA) 5 MG tablet Take 5 mg by mouth daily 21  Yes Historical Provider, MD   pramipexole (MIRAPEX) 0.5 MG tablet TAKE 1 TABLET BY MOUTH EVERYDAY AT BEDTIME 10/27/21  Yes Paul Chun DO   metoprolol succinate (TOPROL XL) 50 MG extended release tablet TAKE ONE (1) TABLET BY MOUTH ONCE DAILY 9/16/21  Yes Hope Crown, DO   hydroCHLOROthiazide (MICROZIDE) 12.5 MG capsule TAKE 2 CAPSULES BY MOUTH EVERY MORNING 7/26/21  Yes Hope Crown, DO   memantine (NAMENDA) 5 MG tablet TAKE 1 TABLET BY MOUTH EVERY DAY 7/26/21  Yes Hope Lamoni, DO     Past Medical History:   Diagnosis Date    Basal cell carcinoma of skin     Right Lower Eyelid    [facility]     Cataract, bilateral     CKD (chronic kidney disease)     Family history of colon cancer     H/O Doppler ultrasound 10/31/2018    No evidence of AAA within the visualized portions of the abdominal aorta    H/O echocardiogram 11/14/2018    EF 55-60%    History of nuclear stress test 10/31/2018    Normal LV function    Hyperlipidemia     Hypertension     Macular hole, right eye     Obstructive sleep apnea (adult) (pediatric)     RLS (restless legs syndrome)     Type 2 diabetes mellitus with diabetic neuropathy (HCC)     Type 2 diabetes mellitus with hyperglycemia ([facility]     Unspecified dementia without behavioral disturbance ([facility]       Vitals:    12/29/21 1051   BP: 120/76   Pulse: 82   Weight: 212 lb 12.8 oz (96.5 kg)   Height: 5' 8\" (1.727 m)      Body mass index is 32.36 kg/m². Wt Readings from Last 3 Encounters:   12/29/21 212 lb 12.8 oz (96.5 kg)   12/14/21 207 lb 3.2 oz (94 kg)   12/07/21 212 lb 9.6 oz (96.4 kg)     Constitutional: Moderately overweight pleasant male in no apparent distress. Gained 4 pounds since last visit  Eyes: He is wearing glasses  ENT: He is wearing a facemask otherwise unremarkable  NECK: No JVP or thyromegaly  Cardiovascular:  Auscultation: Normal S1 and S2.  No significant murmurs or gallops noted. Carotids are negative for bruits.  Abdominal aorta is nonpalpable.  No epigastric bruit noted. Peripheral pulses:  Both posterior tibial and dorsalis pedis are 2+ equal in both feet  Respiratory:  Respiratory effort is normal.  Breath sounds are diminished with bibasal fine crepitations up to one third posteriorly.  No wheezing noted  Extremities:  No edema, clubbing,  Cyanosis, petechiae. SKIN: Warm and well perfused, no pallor or cyanosis  Abdomen:  No masses or tenderness. No organomegaly noted. Musculoskeletal: No obvious spinal deformities noted.  Gait is normal.  Muscle strength is normal  Neurologic:  Oriented to time, place, and person and non-anxious. No focal neurological deficit noted. 6/22/2021 Echo  Normal left ventricle structure and systolic function with an ejection   fraction of 55-60%. Mild concentric left ventricular hypertrophy with Grade I diastolic   dysfunction. Mild aortic insufficiency with PHT of 622 msec. Mild mitral and tricuspid regurgitation. Normal pulmonary artery pressure with a RVSP of 23mmHg. Dilation of the aortic root measuring (3.8cm). No evidence of pericardial effusion. INCIDENTAL FINDING:Hepatic cyst measuring (3.40cm x 2.80cm) with no color   flow present. 6/2/21 Chest Xray  No acute process.       Interstitial prominence throughout both lungs suggesting fibrosis     Pertinent records reviewed and discussed with patient and results are as follow:    Lab Results   Component Value Date    WBC 9.2 12/14/2021    WBC 7.7 05/15/2018    HGB 12.8 12/14/2021    HCT 38.9 12/14/2021     12/14/2021     Lab Results   Component Value Date    CHOL 116 06/25/2021    TRIG 90 06/25/2021    HDL 36 06/25/2021    LDLCALC 62 06/25/2021     Lab Results   Component Value Date    BUN 36 12/14/2021    CREATININE 2.5 12/14/2021     12/14/2021    K 4.7 12/14/2021     No results found for: INR  Lab Results   Component Value Date    LABA1C 9.6 (H) 12/14/2021     ASSESSMENT/PLAN:    1. Essential hypertension  2. Pulmonary fibrosis ([address]  3. Type 2 diabetes mellitus without complication, with long-term current use of insulin (HCC)  4. Stage 3 chronic kidney disease, unspecified whether stage 3a or 3b CKD ([address]  5.  DENISE (obstructive sleep apnea)      Continue current cardiovascular medications which have been reviewed and discussed individually with you. Counseled for calorie counting, reduction in serving size and exercise and lifestyle modification for weight loss. Follow-up in 12 months with EKG, sooner if needed. An electronic signature was used to authenticate this note.     --Vanessa Lemon MD

## 2022-01-22 DIAGNOSIS — I10 ESSENTIAL HYPERTENSION: ICD-10-CM

## 2022-01-24 RX ORDER — HYDROCHLOROTHIAZIDE 12.5 MG/1
CAPSULE, GELATIN COATED ORAL
Qty: 60 CAPSULE | Refills: 5 | Status: SHIPPED | OUTPATIENT
Start: 2022-01-24

## 2022-01-24 RX ORDER — MEMANTINE HYDROCHLORIDE 5 MG/1
TABLET ORAL
Qty: 30 TABLET | Refills: 5 | Status: SHIPPED | OUTPATIENT
Start: 2022-01-24

## 2022-01-28 ENCOUNTER — TELEPHONE (OUTPATIENT)
Dept: INTERNAL MEDICINE CLINIC | Age: 82
End: 2022-01-28

## 2022-01-28 NOTE — TELEPHONE ENCOUNTER
Received call from [facility] at [employer] ([facility] assistance). They had received assistance form, filled out by PCP, but they need last OV. OV 12/14/21 faxed. No further action is needed, at this time.

## 2022-02-11 PROBLEM — E11.65 TYPE 2 DIABETES MELLITUS WITH HYPERGLYCEMIA (HCC): Status: ACTIVE | Noted: 2022-02-11

## 2022-02-21 RX ORDER — METOPROLOL SUCCINATE 50 MG/1
TABLET, EXTENDED RELEASE ORAL
Qty: 30 TABLET | Refills: 5 | Status: SHIPPED | OUTPATIENT
Start: 2022-02-21

## 2022-03-09 ENCOUNTER — OFFICE VISIT (OUTPATIENT)
Dept: NEUROLOGY | Age: 82
End: 2022-03-09
Payer: COMMERCIAL

## 2022-03-09 VITALS
WEIGHT: 213 LBS | OXYGEN SATURATION: 97 % | SYSTOLIC BLOOD PRESSURE: 133 MMHG | HEART RATE: 77 BPM | BODY MASS INDEX: 32.28 KG/M2 | HEIGHT: 68 IN | DIASTOLIC BLOOD PRESSURE: 82 MMHG

## 2022-03-09 DIAGNOSIS — G25.81 RESTLESS LEG SYNDROME: ICD-10-CM

## 2022-03-09 DIAGNOSIS — F03.91 DEMENTIA WITH BEHAVIORAL DISTURBANCE, UNSPECIFIED DEMENTIA TYPE: ICD-10-CM

## 2022-03-09 DIAGNOSIS — E55.9 VITAMIN D DEFICIENCY: ICD-10-CM

## 2022-03-09 DIAGNOSIS — E11.42 DIABETIC PERIPHERAL NEUROPATHY (HCC): Primary | ICD-10-CM

## 2022-03-09 LAB
A/G RATIO: 1.4 (ref 1.1–2.2)
ALBUMIN SERPL-MCNC: 4.2 G/DL (ref 3.4–5)
ALP BLD-CCNC: 74 U/L (ref 40–129)
ALT SERPL-CCNC: 9 U/L (ref 10–40)
ANION GAP SERPL CALCULATED.3IONS-SCNC: 17 MMOL/L (ref 3–16)
AST SERPL-CCNC: 15 U/L (ref 15–37)
BILIRUB SERPL-MCNC: 0.5 MG/DL (ref 0–1)
BUN BLDV-MCNC: 49 MG/DL (ref 7–20)
CALCIUM SERPL-MCNC: 9.4 MG/DL (ref 8.3–10.6)
CHLORIDE BLD-SCNC: 101 MMOL/L (ref 99–110)
CO2: 21 MMOL/L (ref 21–32)
CREAT SERPL-MCNC: 2.2 MG/DL (ref 0.8–1.3)
FERRITIN: 383.1 NG/ML (ref 30–400)
FOLATE: 8.23 NG/ML (ref 4.78–24.2)
GFR AFRICAN AMERICAN: 35
GFR NON-AFRICAN AMERICAN: 29
GLUCOSE BLD-MCNC: 222 MG/DL (ref 70–99)
IRON SATURATION: 27 % (ref 20–50)
IRON: 81 UG/DL (ref 59–158)
POTASSIUM SERPL-SCNC: 4.6 MMOL/L (ref 3.5–5.1)
PROTEIN PROTEIN: 0.09 G/DL
PROTEIN PROTEIN: 90 MG/DL
SODIUM BLD-SCNC: 139 MMOL/L (ref 136–145)
TOTAL IRON BINDING CAPACITY: 302 UG/DL (ref 260–445)
TOTAL PROTEIN: 7.2 G/DL (ref 6.4–8.2)
TSH SERPL DL<=0.05 MIU/L-ACNC: 5.14 UIU/ML (ref 0.27–4.2)
VITAMIN B-12: 275 PG/ML (ref 211–911)
VITAMIN D 25-HYDROXY: 48.4 NG/ML

## 2022-03-09 PROCEDURE — 36415 COLL VENOUS BLD VENIPUNCTURE: CPT | Performed by: STUDENT IN AN ORGANIZED HEALTH CARE EDUCATION/TRAINING PROGRAM

## 2022-03-09 PROCEDURE — 99214 OFFICE O/P EST MOD 30 MIN: CPT | Performed by: STUDENT IN AN ORGANIZED HEALTH CARE EDUCATION/TRAINING PROGRAM

## 2022-03-09 RX ORDER — ASCORBIC ACID 500 MG
500 TABLET ORAL DAILY
COMMUNITY

## 2022-03-09 RX ORDER — GABAPENTIN 300 MG/1
CAPSULE ORAL
Qty: 14 CAPSULE | Refills: 0 | Status: SHIPPED | OUTPATIENT
Start: 2022-03-09 | End: 2022-06-07

## 2022-03-09 RX ORDER — GABAPENTIN 300 MG/1
CAPSULE ORAL
Qty: 90 CAPSULE | Refills: 2 | Status: SHIPPED | OUTPATIENT
Start: 2022-03-21 | End: 2022-06-07 | Stop reason: SDUPTHER

## 2022-03-09 RX ORDER — MEMANTINE HYDROCHLORIDE 10 MG/1
10 TABLET ORAL 2 TIMES DAILY
Qty: 60 TABLET | Refills: 5 | Status: SHIPPED | OUTPATIENT
Start: 2022-03-09 | End: 2022-08-22

## 2022-03-09 NOTE — PROGRESS NOTES
does report becoming sexually inappropriate at nighttime with his wife. They are currently trying to figure out the possibility of getting them 2 separate bedrooms or 2 separate beds within the same room. In further conversation, the patient is currently on Mirapex for restless leg syndrome. He apparently has of pretty severe restless leg syndrome that can keep him up at nighttime if he does not take this medication. Did discuss with them that I have concerns that the Mirapex could be resulting in compulsive behaviors. He is currently on memantine 5 mg twice a day. Donepezil was discontinued due to bradycardia. He had an MRI brain completed in April 2021 which demonstrated advanced generalized cerebral atrophy with mild chronic small vessel ischemic disease and a small area of encephalomalacia at the junction of the right temporal and right occipital lobe. He is currently on aspirin and statin for secondary stroke prevention. He does report history of diabetes. Both he and his daughter admit that his sugars are not well managed. His most recent A1c was over 9. He currently reports numbness and pain in his feet. It is worse at nighttime. He admits to furniture walking and holding onto the walls when he is showering. He now has handrails in the shower for stability at his new facility. There have been no falls. He does have a paternal aunt that had dementia.       Review of Symptoms:  Neurologic   Symptoms: no difficulty with gait or walking, no bowel symptoms, no vertigo, no confusion,+memory loss, no speech disorder, no visual loss, no double vision, no dizziness, no loss of hearing, +sensory disturbances, no weakness, no headaches, no bladder symptoms, no seizures, no excessive fatigue and no syncope    Current Outpatient Medications   Medication Sig Dispense Refill    ascorbic acid (VITAMIN C) 500 MG tablet Take 500 mg by mouth daily      memantine (NAMENDA) 10 MG tablet Take 1 tablet by mouth 2 times daily 60 tablet 5    gabapentin (NEURONTIN) 300 MG capsule Add 300mg to nighttime dose 14 capsule 0    [START ON 3/21/2022] gabapentin (NEURONTIN) 300 MG capsule Take 1 tablet in the morning and two tablets in the evening 90 capsule 2    metoprolol succinate (TOPROL XL) 50 MG extended release tablet TAKE 1 TABLET BY MOUTH EVERY DAY 30 tablet 5    Cinnamon 500 MG CAPS Take 2,000 mg by mouth daily      melatonin 1 MG tablet Take 5-10 mg by mouth nightly      hydroCHLOROthiazide (MICROZIDE) 12.5 MG capsule TAKE 2 CAPSULES BY MOUTH EVERY MORNING 60 capsule 5    memantine (NAMENDA) 5 MG tablet TAKE 1 TABLET BY MOUTH EVERY DAY 30 tablet 5    glipiZIDE (GLUCOTROL XL) 10 MG extended release tablet Take 10 mg by mouth daily      pravastatin (PRAVACHOL) 40 MG tablet TAKE 1 TABLET BY MOUTH EVERY DAY 30 tablet 5    losartan (COZAAR) 100 MG tablet TAKE 1 TABLET BY MOUTH ONCE DAILY FOR HIGH BLOOD PRESSURE 30 tablet 5    linagliptin (TRADJENTA) 5 MG tablet Take 5 mg by mouth daily      pramipexole (MIRAPEX) 0.5 MG tablet TAKE 1 TABLET BY MOUTH EVERYDAY AT BEDTIME 30 tablet 5    gabapentin (NEURONTIN) 300 MG capsule TAKE ONE (1) CAPSULE BY MOUTH ONCE DAILY 30 capsule 5     No current facility-administered medications for this visit.        Past Medical History:   Diagnosis Date    Basal cell carcinoma of skin     Right Lower Eyelid    Cancer (HCC)     Cataract, bilateral     CKD (chronic kidney disease)     Family history of colon cancer     H/O Doppler ultrasound 10/31/2018    No evidence of AAA within the visualized portions of the abdominal aorta    H/O echocardiogram 11/14/2018    EF 55-60%    History of nuclear stress test 10/31/2018    Normal LV function    Hyperlipidemia     Hypertension     Macular hole, right eye     Obstructive sleep apnea (adult) (pediatric)     RLS (restless legs syndrome)     Type 2 diabetes mellitus with diabetic neuropathy (HCC)     Type 2 diabetes mellitus with hyperglycemia ([facility]     Unspecified dementia without behavioral disturbance [facility])        Past Surgical History:   Procedure Laterality Date    COLONOSCOPY  2012    sig diverticula-Recomm repeat in 5 yrs    MALIGNANT SKIN LESION EXCISION  10/26/2017    BCC excision with direct reconstruction right lower eyelid        Social History     Socioeconomic History    Marital status:      Spouse name: Not on file    Number of children: Not on file    Years of education: Not on file    Highest education level: Not on file   Occupational History    Occupation: Retired   Tobacco Use    Smoking status: Former Smoker     Packs/day: 1.00     Years: 35.00     Pack years: 35.00     Quit date:      Years since quittin.2    Smokeless tobacco: Never Used    Tobacco comment: caffeine 1 coffee a day and 3 pops a day   Substance and Sexual Activity    Alcohol use: No    Drug use: No    Sexual activity: Not on file   Other Topics Concern    Not on file   Social History Narrative    Not on file     Social Determinants of Health     Financial Resource Strain: Low Risk     Difficulty of Paying Living Expenses: Not hard at all   Food Insecurity: No Food Insecurity    Worried About [employer] [employer] in the Last Year: Never true    [facility] in the Last Year: Never true   Transportation Needs:     Lack of Transportation (Medical): Not on file    Lack of Transportation (Non-Medical):  Not on file   Physical Activity:     Days of Exercise per Week: Not on file    Minutes of Exercise per Session: Not on file   Stress:     Feeling of Stress : Not on file   Social Connections:     Frequency of Communication with Friends and Family: Not on file    Frequency of Social Gatherings with Friends and Family: Not on file    Attends Mandaeism Services: Not on file    Active Member of Clubs or Organizations: Not on file    Attends Club or Organization Meetings: Not on file    Marital Status: Not on file   Intimate Partner Violence:     Fear of Current or Ex-Partner: Not on file    Emotionally Abused: Not on file    Physically Abused: Not on file    Sexually Abused: Not on file   Housing Stability:     Unable to Pay for Housing in the Last Year: Not on file    Number of [place] in the Last Year: Not on file    Unstable Housing in the Last Year: Not on file       Family History   Problem Relation Age of Onset    Stroke Mother     Kidney Disease Mother     Cancer Father         skin cancer    No Known Problems Sister     No Known Problems Brother        Objective    Physical Exam:  Also present during visit: daughter.     Constitutional   Weight: well nourished  Heart/Vascular   No cyanosis or clubbing appreciated  Neck   Appearance/Palpation/Auscultation: supple  Mental Status   Orientation: oriented to person, oriented to place, oriented to problem and disoriented to time   Mood/Affect: appropriate mood and appropriate affect   Memory/Other: recent memory impaired, remote memory intact, fund of knowledge intact, attention span normal and concentration normal  Language   Language: (normal) language, no dysarthria, (normal) articulation and no dysphasia/aphasia  Cranial Nerves   CN II Right: visual fields appear intact   CN II Left: visual fields appear intact   CN III, IV, VI: EOM no nystagmus, normal pursuit and extraocular muscle strength normal   CN III: pupil normal size, pupil reactive to light and dark, pupil accomodates and no ptosis   CN IV: normal   CN VI: normal   CN V Right: normal sensation and muscles of mastication intact   CN V Left: normal sensation and muscles of mastication intact   CN VII Right: normal facial expression   CN VII Left: normal facial expression   CN VIII Right: hearing in tact to normal conversation   CN VIII Left: hearing in tact to normal conversation   CN IX,X: normal palatal movement   CN XI Right: normal sternocleidomastoid and normal trapezius   CN XI Left: normal sternocleidomastoid and normal trapezius   CN XII: no tremors of the tongue, no fasciculation of the tongue, tongue protrudes midline, normal power to left and normal power to right  Gait and Stance   Gait/Posture: station normal, ambulates independently, and gait normal  Motor/Coordination Exam   General: no bradykinesia, no tremors, no chorea, no athetosis, no myoclonus and no dyskinesia   Right Upper Extremity: normal motor strength, normal bulk and normal tone   Left Upper Extremity: normal motor strength, normal bulk and normal tone   Right Lower Extremity: normal motor strength, normal bulk and normal tone   Left Lower Extremity: normal motor strength, normal bulk and normal tone   Coordination: no drift, normal finger-to-nose, normal heel-to-shin and rapid alternating movements normal  Reflexes   Reflexes Right: 2/4 biceps, brachioradialis, patellar, and achilles    Reflexes Left: 2/4 biceps, brachioradialis, patellar, and achilles    Plantar Reflex Right: response downgoing   Plantar Reflex Left: response downgoing   Hoffmans Reflex Right: absent   Hoffmans Reflex Left: absent   Frontal Release Signs: palmomental present right and grasp present bilateral  Sensory   Sensation: Sensation to vibration and pinprick bilateral lower extremities in a length dependent fashion, normal DSS, and no neglect    Lungs   No auditory wheezing  Skin   Inspection: no jaundice, no lesions, no rashes and no cyanosis      /82 (Site: Left Upper Arm, Position: Sitting, Cuff Size: Large Adult)   Pulse 77   Ht 5' 8\" (1.727 m)   Wt 213 lb (96.6 kg)   SpO2 97%   BMI 32.39 kg/m²     Assessment and Plan     Diagnosis Orders   1.  Diabetic peripheral neuropathy (HCC)  Vitamin B12 & Folate    TSH    Electrophoresis Protein, Serum    Protein Electrophoresis, Urine    Hemoglobin A1C    Comprehensive Metabolic Panel    Vitamin D 25 Hydroxy    gabapentin (NEURONTIN) 300 MG capsule    gabapentin (NEURONTIN) 300 MG capsule    Vitamin D 25 Hydroxy    Comprehensive Metabolic Panel    Hemoglobin A1C    Electrophoresis Protein, Serum    TSH    Vitamin B12 & Folate    Protein Electrophoresis, Urine   2. Dementia with behavioral disturbance, unspecified dementia type (HCC)  memantine (NAMENDA) 10 MG tablet   3. Restless leg syndrome  Iron and TIBC    Ferritin    gabapentin (NEURONTIN) 300 MG capsule    gabapentin (NEURONTIN) 300 MG capsule    Ferritin    Iron and TIBC   4. Vitamin D deficiency  Vitamin D 25 Hydroxy    Vitamin D 25 Hydroxy     Aniceto Marshall was seen today in neurologic consultation as a transfer of care for dementia and peripheral neuropathy. 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 I will have him continue on Namenda but will increase the dose to 10 mg twice a day. He also has behavioral disturbances, however, I am suspicious that this might be related to medication side effect. He is currently on Mirapex for management of his restless leg syndrome which is known to cause compulsive behaviors which can include sexual compulsions. He is fearful of coming off of this medication at this time out of concern that his restless leg syndrome will preclude him from being able to sleep at nighttime. We have come up with a game plan to attempt to get him off of this. We will have him increase his gabapentin so that he is taking 600 mg at bedtime and 300 mg in the morning. If he sees improvement with this in regards to his restless leg syndrome, then we will wean him off of Mirapex. Additionally, I do feel that the increase in his gabapentin dose will be beneficial from a neuropathic pain standpoint as he does have evidence of a length dependent peripheral neuropathy on examination.   I am going to obtain further lab work looking for reversible causes of peripheral neuropathy in addition to his hyperglycemia. I will also be checking iron studies today to see if there is any component of iron deficiency that could be contributing to his restless leg syndrome. We discussed fall prevention is an important factor in treatment of peripheral neuropathy. He is not currently interested in balance therapy but will be getting more involved in the physical activities that are offered at his independent facility. I discussed with him that this would be beneficial from both a memory and peripheral neuropathy standpoint. Medications prescribed for the patient were discussed in detail. This included a discussion of the potential risks vs the potential benefits of the medications. The patient was given time to ask questions and these were answered to the best of my ability. The patient appeared to understand the information provided. Return in about 3 months (around 6/9/2022) for follow up with KELVIN.     Tanika Blair DO  Neurologist

## 2022-03-10 LAB
ESTIMATED AVERAGE GLUCOSE: 237.4 MG/DL
HBA1C MFR BLD: 9.9 %

## 2022-03-11 LAB
ALBUMIN SERPL-MCNC: 3.3 G/DL (ref 3.1–4.9)
ALPHA-1-GLOBULIN: 0.2 G/DL (ref 0.2–0.4)
ALPHA-2-GLOBULIN: 1 G/DL (ref 0.4–1.1)
BETA GLOBULIN: 1.3 G/DL (ref 0.9–1.6)
GAMMA GLOBULIN: 1.4 G/DL (ref 0.6–1.8)
SPE/IFE INTERPRETATION: NORMAL

## 2022-03-14 LAB — URINE ELECTROPHORESIS INTERP: NORMAL

## 2022-03-31 DIAGNOSIS — E11.65 TYPE 2 DIABETES MELLITUS WITH HYPERGLYCEMIA, WITHOUT LONG-TERM CURRENT USE OF INSULIN (HCC): Primary | ICD-10-CM

## 2022-03-31 RX ORDER — LANCETS 30 GAUGE
1 EACH MISCELLANEOUS DAILY
Qty: 100 EACH | Refills: 3 | Status: SHIPPED | OUTPATIENT
Start: 2022-03-31

## 2022-03-31 RX ORDER — GLUCOSAMINE HCL/CHONDROITIN SU 500-400 MG
CAPSULE ORAL
Qty: 100 STRIP | Refills: 3 | Status: SHIPPED | OUTPATIENT
Start: 2022-03-31

## 2022-03-31 RX ORDER — CYANOCOBALAMIN 1000 UG/ML
1000 INJECTION INTRAMUSCULAR; SUBCUTANEOUS
Qty: 3 ML | Refills: 3 | Status: SHIPPED | OUTPATIENT
Start: 2022-03-31

## 2022-03-31 NOTE — TELEPHONE ENCOUNTER
Received fax requesting DM test supplies, from [facility] [employer] [facility]. Pt non-insulin, so ordered 1 QD and PRN for SX of abnormal glucose. Pt daughter Clark Reyes also reports recent labs from [address] [place] [address] showing B12 Deficiency (In chart). She is requesting Rx to [facility]. New Rx pended.

## 2022-04-05 ENCOUNTER — TELEPHONE (OUTPATIENT)
Dept: INTERNAL MEDICINE CLINIC | Age: 82
End: 2022-04-05

## 2022-04-05 NOTE — TELEPHONE ENCOUNTER
----- Message from Misbah Box sent at 4/4/2022  3:55 PM EDT -----  Subject: Message to Provider    QUESTIONS  Information for Provider? Trent [place] Mishel is leaving a message   for the nurse stating that had seen that patient today for home care after   being hospitalized. Nursing is going to see him once a week. OT and PT are   going to evaluate and treat. No medication discrepancies and interactions   are noted. ---------------------------------------------------------------------------  --------------  Obdulia SINGH  What is the best way for the office to contact you? OK to leave message on   voicemail  Preferred Call Back Phone Number? [phone]  ---------------------------------------------------------------------------  --------------  SCRIPT ANSWERS  Relationship to Patient? Third Party  Third Party Type? Home Health Care? Representative Name?  Trent [place] Mishel

## 2022-04-12 ENCOUNTER — TELEPHONE (OUTPATIENT)
Dept: INTERNAL MEDICINE CLINIC | Age: 82
End: 2022-04-12

## 2022-04-12 DIAGNOSIS — R09.89 BIBASILAR CRACKLES: Primary | ICD-10-CM

## 2022-04-12 NOTE — TELEPHONE ENCOUNTER
----- Message from Maile Hayes sent at 4/12/2022  1:59 PM EDT -----  Subject: Message to Provider    QUESTIONS  Information for Provider? Luh Silverman from [employer] called to report   that has crackles in bilateral bases. Please call back. ---------------------------------------------------------------------------  --------------  [employer] INFO  What is the best way for the office to contact you? OK to leave message on   voicemail  Preferred Call Back Phone Number? [phone]  ---------------------------------------------------------------------------  --------------  SCRIPT ANSWERS  Relationship to Patient? Third Party  Third Party Type? Other  Other Third Party Type? [employer] Name?  Luh Silverman

## 2022-04-13 ENCOUNTER — TELEPHONE (OUTPATIENT)
Dept: INTERNAL MEDICINE CLINIC | Age: 82
End: 2022-04-13

## 2022-04-13 NOTE — TELEPHONE ENCOUNTER
Pt states he have a cough, but no acute distress. CXR ordered for follow up.  Unable to reach  Home care

## 2022-04-13 NOTE — TELEPHONE ENCOUNTER
[address] [facility] orders for patients X-rays to be sent. He is at their imaging center now.  Fax# [phone]

## 2022-04-15 ENCOUNTER — TELEPHONE (OUTPATIENT)
Dept: INTERNAL MEDICINE CLINIC | Age: 82
End: 2022-04-15

## 2022-04-15 NOTE — TELEPHONE ENCOUNTER
Pt's daughter c/o increased fatigue and gait disturbance, that is not only noticeable to her, but to pt as well. She reports he has started B12 injections, no noticeable improvement. Last A1c was 10, (3/09/22) TSH was 5. 14. Cosme Kirby informed that PCP is out of office today, due to Marina Anderson. Will relay message to PCP. Continue to monitor. ER if SX worsen, or if in distress. (normal CXR result given)    Please advise.

## 2022-04-20 RX ORDER — PRAMIPEXOLE DIHYDROCHLORIDE 0.5 MG/1
TABLET ORAL
Qty: 30 TABLET | Refills: 1 | Status: SHIPPED | OUTPATIENT
Start: 2022-04-20

## 2022-04-29 LAB — DIABETIC RETINOPATHY: POSITIVE

## 2022-05-20 RX ORDER — GABAPENTIN 300 MG/1
CAPSULE ORAL
Qty: 90 CAPSULE | Refills: 5 | Status: SHIPPED | OUTPATIENT
Start: 2022-05-20 | End: 2022-06-07

## 2022-05-20 NOTE — TELEPHONE ENCOUNTER
Requested Prescriptions     Pending Prescriptions Disp Refills    gabapentin (NEURONTIN) 300 MG capsule [Pharmacy Med Name: GABAPENTIN 300 MG CAPSULE] 90 capsule 2     Sig: TAKE 1 TABLET IN THE MORNING AND TWO TABLETS IN THE EVENING

## 2022-06-07 ENCOUNTER — OFFICE VISIT (OUTPATIENT)
Dept: NEUROLOGY | Age: 82
End: 2022-06-07
Payer: COMMERCIAL

## 2022-06-07 VITALS
HEIGHT: 68 IN | HEART RATE: 88 BPM | SYSTOLIC BLOOD PRESSURE: 136 MMHG | DIASTOLIC BLOOD PRESSURE: 72 MMHG | BODY MASS INDEX: 32.28 KG/M2 | OXYGEN SATURATION: 97 % | WEIGHT: 213 LBS

## 2022-06-07 DIAGNOSIS — G25.81 RLS (RESTLESS LEGS SYNDROME): ICD-10-CM

## 2022-06-07 DIAGNOSIS — I63.9 CEREBROVASCULAR ACCIDENT (CVA), UNSPECIFIED MECHANISM (HCC): ICD-10-CM

## 2022-06-07 DIAGNOSIS — R00.1 BRADYCARDIA: ICD-10-CM

## 2022-06-07 DIAGNOSIS — F03.91 DEMENTIA WITH BEHAVIORAL DISTURBANCE, UNSPECIFIED DEMENTIA TYPE: ICD-10-CM

## 2022-06-07 DIAGNOSIS — N18.32 STAGE 3B CHRONIC KIDNEY DISEASE (HCC): ICD-10-CM

## 2022-06-07 DIAGNOSIS — E11.42 DIABETIC POLYNEUROPATHY ASSOCIATED WITH TYPE 2 DIABETES MELLITUS (HCC): ICD-10-CM

## 2022-06-07 DIAGNOSIS — E11.42 DIABETIC PERIPHERAL NEUROPATHY (HCC): Primary | ICD-10-CM

## 2022-06-07 DIAGNOSIS — G25.81 RESTLESS LEG SYNDROME: ICD-10-CM

## 2022-06-07 PROCEDURE — 3046F HEMOGLOBIN A1C LEVEL >9.0%: CPT | Performed by: NURSE PRACTITIONER

## 2022-06-07 PROCEDURE — 1123F ACP DISCUSS/DSCN MKR DOCD: CPT | Performed by: NURSE PRACTITIONER

## 2022-06-07 PROCEDURE — 99214 OFFICE O/P EST MOD 30 MIN: CPT | Performed by: NURSE PRACTITIONER

## 2022-06-07 RX ORDER — GABAPENTIN 300 MG/1
CAPSULE ORAL
Qty: 450 CAPSULE | Refills: 0 | Status: SHIPPED | OUTPATIENT
Start: 2022-06-07 | End: 2022-08-26

## 2022-06-07 RX ORDER — PHYTONADIONE 2 MG/ML
1 INJECTION, EMULSION INTRAMUSCULAR; INTRAVENOUS; SUBCUTANEOUS
COMMUNITY

## 2022-06-07 RX ORDER — DULAGLUTIDE 1.5 MG/.5ML
INJECTION, SOLUTION SUBCUTANEOUS
COMMUNITY
Start: 2022-05-19

## 2022-06-07 NOTE — PROGRESS NOTES
his walking as his legs have been feeling very heavy. This has occurred over the past few months seemingly out of the blue daughter reported. It has improved somewhat but is still occurring. Daughter denies Keith Max having any urinary incontinence, increased confusion or changes in behavior. Over the past couple of years, Keith Max has been having difficulty swallowing foods like rice and peas. He denies a change in his voice or increased salivation. Current Outpatient Medications   Medication Sig Dispense Refill    TRULICITY 1.5 RW/3.5ZI SOPN INJECT 1.5 MG UNDER THE SKIN ONCE A WEEK. INJECT ONCE EVERY 7 DAYS      Vitamin K (PHYTONADIONE) 1 MG/0.5ML SOLN Inject 1 mg into the skin every 30 days      pramipexole (MIRAPEX) 0.5 MG tablet TAKE 1 TABLET BY MOUTH EVERYDAY AT BEDTIME 30 tablet 1    blood glucose monitor strips Test 1 times a day & as needed for symptoms of irregular blood glucose. 100 strip 3    Lancets MISC 1 each by Does not apply route daily 100 each 3    blood glucose monitor kit and supplies Pt to test 1 QD and PRN for SX of abnormal glucose.  1 kit 0    cyanocobalamin 1000 MCG/ML injection Inject 1 mL into the muscle every 30 days Please supply 3, 1 mL vials, for 90 day supply 3 mL 3    SYRINGE-NEEDLE, DISP, 3 ML 25G X 1\" 3 ML MISC 1 each by Does not apply route every 30 days 3 each 3    ascorbic acid (VITAMIN C) 500 MG tablet Take 500 mg by mouth daily      memantine (NAMENDA) 10 MG tablet Take 1 tablet by mouth 2 times daily 60 tablet 5    gabapentin (NEURONTIN) 300 MG capsule Take 1 tablet in the morning and two tablets in the evening 90 capsule 2    metoprolol succinate (TOPROL XL) 50 MG extended release tablet TAKE 1 TABLET BY MOUTH EVERY DAY 30 tablet 5    Cinnamon 500 MG CAPS Take 2,000 mg by mouth daily      melatonin 1 MG tablet Take 5-10 mg by mouth nightly      hydroCHLOROthiazide (MICROZIDE) 12.5 MG capsule TAKE 2 CAPSULES BY MOUTH EVERY MORNING 60 capsule 5    memantine (NAMENDA) 5 MG tablet TAKE 1 TABLET BY MOUTH EVERY DAY 30 tablet 5    glipiZIDE (GLUCOTROL XL) 10 MG extended release tablet Take 10 mg by mouth daily      pravastatin (PRAVACHOL) 40 MG tablet TAKE 1 TABLET BY MOUTH EVERY DAY 30 tablet 5    losartan (COZAAR) 100 MG tablet TAKE 1 TABLET BY MOUTH ONCE DAILY FOR HIGH BLOOD PRESSURE 30 tablet 5     No current facility-administered medications for this visit.        Physical Exam:  Constitutional              Weight: well nourished  Heart/Vascular              No cyanosis or clubbing appreciated  Neck              Appearance/Palpation/Auscultation: supple  Mental Status              Orientation: oriented to person, oriented to place, oriented to problem and disoriented to time              Mood/Affect: appropriate mood and appropriate affect              Memory/Other: recent memory impaired, remote memory intact, fund of knowledge intact, attention span normal and concentration normal  Language              Language: (normal) language, no dysarthria, (normal) articulation and no dysphasia/aphasia  Cranial Nerves              CN II Right: visual fields appear intact              CN II Left: visual fields appear intact              CN III, IV, VI: EOM no nystagmus, normal pursuit and extraocular muscle strength normal              CN III: pupil normal size, pupil reactive to light and dark, pupil accomodates and no ptosis              CN IV: normal              CN VI: normal              CN V Right: normal sensation and muscles of mastication intact              CN V Left: normal sensation and muscles of mastication intact              CN VII Right: normal facial expression              CN VII Left: normal facial expression              CN VIII Right: hearing in tact to normal conversation              CN VIII Left: hearing in tact to normal conversation              CN IX,X: normal palatal movement              CN XI Right: normal sternocleidomastoid and normal trapezius              CN XI Left: normal sternocleidomastoid and normal trapezius              CN XII: no tremors of the tongue, no fasciculation of the tongue, tongue protrudes midline, normal power to left and normal power to right  Gait and Stance              Gait/Posture: station normal, ambulates independently, and gait normal. Decreased arm swing left side, leans to the right with ambulation. Motor/Coordination Exam              General: no bradykinesia, mild action tremor bilateral arms, no chorea, no athetosis, no myoclonus and no dyskinesia. Left hand dysdiadochokinesia.               Right Upper Extremity: normal motor strength, normal bulk and normal tone              Left Upper Extremity: normal motor strength, normal bulk and normal tone              Right Lower Extremity: normal motor strength, normal bulk and normal tone              Left Lower Extremity: normal motor strength-mildly decreased with pushing against force, normal bulk and normal tone              Coordination: no drift, normal finger-to-nose, normal heel-to-shin and rapid alternating movements slower with left hand than right hand   Reflexes              Reflexes Right: 2/4 biceps, brachioradialis, 1/4 patellar, and 1/4 achilles               Reflexes Left: 2/4 biceps, brachioradialis, 1/4 patellar, and 1/4 achilles               Plantar Reflex Right: response downgoing              Plantar Reflex Left: response downgoing              Hoffmans Reflex Right: absent                Hoffmans Reflex Left: absent              Frontal Release Signs: palmomental present right and grasp present bilateral  Sensory              Sensation: Sensation to vibration and pinprick decreased bilateral lower extremities in a length dependent fashion, normal DSS, and no neglect          /72 (Site: Left Upper Arm, Position: Sitting, Cuff Size: Large Adult)   Pulse 88   Ht 5' 8\" (1.727 m)   Wt 213 lb (96.6 kg)   SpO2 97%   BMI 32.39 kg/m² Assessment and Plan     Diagnosis Orders   1. Diabetic peripheral neuropathy [facility])  External Referral To Physical Therapy    gabapentin (NEURONTIN) 300 MG capsule   2. Restless leg syndrome  gabapentin (NEURONTIN) 300 MG capsule   3. Cerebrovascular accident (CVA), unspecified mechanism ([facility] [address]  MRI BRAIN WO CONTRAST   4. Dementia with behavioral disturbance, unspecified dementia type ([address]     5. Diabetic polyneuropathy associated with type 2 diabetes mellitus (HCC)     6. Stage 3b chronic kidney disease ([address]     7. Bradycardia     8. RLS (restless legs syndrome)       I am going to order MRI brain without contrast to rule out CVA as etiology for Malcolm's balance issues. On exam Kelsy Conway did have good strength bilaterally however left lower extremity strength with pushing against force was mildly decreased in comparison to right lower extremity, I do not appreciate any upper extremity weakness. Last office visit note stated that Kelsy Conway was on aspirin and statin for secondary stroke prevention however Kelsy Conway is not currently on aspirin, I will clarify this with daughter. I will be in touch with daughter with results of MRI. In regard to RLS, Kelsy Conway was to discontinue Mirapex once he was on gabapentin as an effort to determine if compulsive behaviors were related to Mirapex rather than frontotemporal dementia. Mirapex was not stopped as daughter misunderstood instruction so Kelsy Conway remains on Mirapex as well as gabapentin. Gabapentin dose was increased by PCP, I did have discussion with daughter that going forward either PCP will manage gabapentin or we will manage gabapentin but I do not want two different providers managing the same medication as this is not a safe practice for patient. Daughter has elected for our group to continue managing gabapentin dosing, daughter understands that no other providers are to change dosing of medications that are prescribed by our group going forward.   I am going to add an additional 300 mg dose of gabapentin with his 2 PM dose so he will be taking a total of 5 pills daily, 1 pill in a.m., 2 pills at 2 PM, 2 pills at 7 PM, hopefully this will better manage his symptoms. On exam Manuela Grajeda had decreased left arm swing with ambulation, some mild left hand dysdiadochokinesia, and reported difficulty swallowing rice. We will continue to follow and assess for further Parkinson's-like symptoms. I am going to order balance therapy for Manuela Grajeda to help improve his balance and decrease his risk for falls. Return in about 3 months (around 9/7/2022).     GINO Cuellar - NP

## 2022-07-06 ENCOUNTER — TELEPHONE (OUTPATIENT)
Dept: NEUROLOGY | Age: 82
End: 2022-07-06

## 2022-07-06 NOTE — TELEPHONE ENCOUNTER
Received call from pt caritor Aleksander Schulte requesting MRI results from [facility] in care everywhere. She also stated pt is having increased difficulty walking and that PT has evaluated but have not gotten back with her yet. Please advise.

## 2022-07-15 NOTE — TELEPHONE ENCOUNTER
Received call back from pt dtr requesting MRI results. Still unable to see in care everywhere. Verified with Milagro at [facility] that MRI completed and she stated she would release results. Still not available. Waited on hold x 10 minutes before leaving another msg for medical records requesting report.

## 2022-07-17 DIAGNOSIS — I10 ESSENTIAL HYPERTENSION: ICD-10-CM

## 2022-07-18 RX ORDER — HYDROCHLOROTHIAZIDE 12.5 MG/1
CAPSULE, GELATIN COATED ORAL
Qty: 60 CAPSULE | Refills: 5 | OUTPATIENT
Start: 2022-07-18

## 2022-07-18 NOTE — TELEPHONE ENCOUNTER
Per [facility]:   I was able to locate MRI results, nothing concerning or new on MRI. Stable findings, unchanged from previous. No new stroke which is what I was looking to evaluate for as explanation for difficulty walking. Notified pt dtr of results per [facility]. She stated that she's concerned pt had increased difficulty with gait directly following hospitalization in April. She stated that his walking has slowed, he has a shuffling gait, and that he's unable to walk down stairs. She's having issues with getting him into the PT in [facility]. Advised that would most likely be the next step and if she needs referral sent to alternative facility we can send. Any other recommendations? Please advise.

## 2022-07-18 NOTE — TELEPHONE ENCOUNTER
MRI brain results 6/30 now available in care everywhere thru [address] Pt dtr has called requesting results several times. She's inquiring if there's a correlation between his results and his gait issues. Please advise.

## 2022-07-19 NOTE — TELEPHONE ENCOUNTER
Notified pt dtr Lawrence Free of info per previous msg per Denys Loja. She vu and stated she is trying to get pt set up in [facility] with current order by the end of the week but if unable will call back Monday to have sent to another facility.

## 2022-08-20 DIAGNOSIS — F03.91 DEMENTIA WITH BEHAVIORAL DISTURBANCE, UNSPECIFIED DEMENTIA TYPE: ICD-10-CM

## 2022-08-22 RX ORDER — MEMANTINE HYDROCHLORIDE 10 MG/1
TABLET ORAL
Qty: 60 TABLET | Refills: 5 | Status: SHIPPED | OUTPATIENT
Start: 2022-08-22

## 2022-08-22 RX ORDER — METOPROLOL SUCCINATE 50 MG/1
TABLET, EXTENDED RELEASE ORAL
Qty: 30 TABLET | Refills: 5 | OUTPATIENT
Start: 2022-08-22

## 2022-08-24 DIAGNOSIS — E11.65 TYPE 2 DIABETES MELLITUS WITH HYPERGLYCEMIA, WITHOUT LONG-TERM CURRENT USE OF INSULIN (HCC): ICD-10-CM

## 2022-08-24 RX ORDER — GLUCOSAM/CHON-MSM1/C/MANG/BOSW 500-416.6
TABLET ORAL
Qty: 200 EACH | OUTPATIENT
Start: 2022-08-24

## 2022-08-24 RX ORDER — CALCIUM CITRATE/VITAMIN D3 200MG-6.25
TABLET ORAL
Qty: 200 STRIP | OUTPATIENT
Start: 2022-08-24

## 2023-10-03 PROBLEM — F03.918 DEMENTIA WITH BEHAVIORAL DISTURBANCE (HCC): Status: ACTIVE | Noted: 2022-03-09
